# Patient Record
Sex: FEMALE | Race: BLACK OR AFRICAN AMERICAN | URBAN - METROPOLITAN AREA
[De-identification: names, ages, dates, MRNs, and addresses within clinical notes are randomized per-mention and may not be internally consistent; named-entity substitution may affect disease eponyms.]

---

## 2017-06-12 ENCOUNTER — HISTORICAL (OUTPATIENT)
Dept: ADMINISTRATIVE | Facility: HOSPITAL | Age: 42
End: 2017-06-12

## 2017-06-12 LAB
ABS NEUT (OLG): 6.44 X10(3)/MCL (ref 2.1–9.2)
BASOPHILS # BLD AUTO: 0.07 X10(3)/MCL
BASOPHILS NFR BLD AUTO: 1 % (ref 0–1)
EOSINOPHIL # BLD AUTO: 0.11 X10(3)/MCL
EOSINOPHIL NFR BLD AUTO: 1 % (ref 0–5)
ERYTHROCYTE [DISTWIDTH] IN BLOOD BY AUTOMATED COUNT: 15.7 % (ref 11.5–14.5)
HCT VFR BLD AUTO: 32.6 % (ref 35–46)
HGB BLD-MCNC: 10 GM/DL (ref 12–16)
IMM GRANULOCYTES # BLD AUTO: 0.01 10*3/UL
IMM GRANULOCYTES NFR BLD AUTO: 0 %
LYMPHOCYTES # BLD AUTO: 2.22 X10(3)/MCL
LYMPHOCYTES NFR BLD AUTO: 23 % (ref 15–40)
MCH RBC QN AUTO: 25.8 PG (ref 26–34)
MCHC RBC AUTO-ENTMCNC: 30.7 GM/DL (ref 31–37)
MCV RBC AUTO: 84 FL (ref 80–100)
MONOCYTES # BLD AUTO: 0.66 X10(3)/MCL
MONOCYTES NFR BLD AUTO: 7 % (ref 4–12)
NEUTROPHILS # BLD AUTO: 6.44 X10(3)/MCL
NEUTROPHILS NFR BLD AUTO: 68 X10(3)/MCL
PLATELET # BLD AUTO: 380 X10(3)/MCL (ref 130–400)
PMV BLD AUTO: 9.6 FL (ref 7.4–10.4)
RBC # BLD AUTO: 3.88 X10(6)/MCL (ref 4–5.2)
TSH SERPL-ACNC: 0.87 MIU/L (ref 0.5–5)
WBC # SPEC AUTO: 9.5 X10(3)/MCL (ref 4.5–11)

## 2019-03-19 ENCOUNTER — HOSPITAL ENCOUNTER (OUTPATIENT)
Dept: ADMINISTRATIVE | Facility: HOSPITAL | Age: 44
End: 2019-03-20
Attending: INTERNAL MEDICINE | Admitting: INTERNAL MEDICINE

## 2019-03-20 LAB
BUN SERPL-MCNC: 6 MG/DL (ref 7–18)
CALCIUM SERPL-MCNC: 8.2 MG/DL (ref 8.5–10.1)
CHLORIDE SERPL-SCNC: 109 MMOL/L (ref 98–107)
CO2 SERPL-SCNC: 23 MMOL/L (ref 21–32)
CREAT SERPL-MCNC: 0.73 MG/DL (ref 0.55–1.02)
CREAT/UREA NIT SERPL: 8.2
ERYTHROCYTE [DISTWIDTH] IN BLOOD BY AUTOMATED COUNT: 17.7 % (ref 11.5–17)
GLUCOSE SERPL-MCNC: 136 MG/DL (ref 74–106)
HCT VFR BLD AUTO: 30.4 % (ref 37–47)
HGB BLD-MCNC: 9.1 GM/DL (ref 12–16)
MAGNESIUM SERPL-MCNC: 2.4 MG/DL (ref 1.8–2.4)
MCH RBC QN AUTO: 24.8 PG (ref 27–31)
MCHC RBC AUTO-ENTMCNC: 29.9 GM/DL (ref 33–36)
MCV RBC AUTO: 82.8 FL (ref 80–94)
PLATELET # BLD AUTO: 337 X10(3)/MCL (ref 130–400)
PMV BLD AUTO: 9.5 FL (ref 9.4–12.4)
POTASSIUM SERPL-SCNC: 4.1 MMOL/L (ref 3.5–5.1)
RBC # BLD AUTO: 3.67 X10(6)/MCL (ref 4.2–5.4)
SODIUM SERPL-SCNC: 140 MMOL/L (ref 136–145)
WBC # SPEC AUTO: 9.6 X10(3)/MCL (ref 4.5–11.5)

## 2022-04-10 ENCOUNTER — HISTORICAL (OUTPATIENT)
Dept: ADMINISTRATIVE | Facility: HOSPITAL | Age: 47
End: 2022-04-10

## 2022-04-24 VITALS
SYSTOLIC BLOOD PRESSURE: 131 MMHG | HEIGHT: 61 IN | WEIGHT: 185.44 LBS | DIASTOLIC BLOOD PRESSURE: 82 MMHG | OXYGEN SATURATION: 99 % | BODY MASS INDEX: 35.01 KG/M2

## 2024-05-01 ENCOUNTER — HOSPITAL ENCOUNTER (INPATIENT)
Facility: HOSPITAL | Age: 49
LOS: 4 days | Discharge: HOME HEALTH | DRG: 069 | End: 2024-05-05
Attending: EMERGENCY MEDICINE | Admitting: HOSPITALIST
Payer: MEDICAID

## 2024-05-01 DIAGNOSIS — R29.818 ACUTE FOCAL NEUROLOGICAL DEFICIT: ICD-10-CM

## 2024-05-01 DIAGNOSIS — R53.1 ACUTE LEFT-SIDED WEAKNESS: ICD-10-CM

## 2024-05-01 DIAGNOSIS — G45.9 TIA (TRANSIENT ISCHEMIC ATTACK): Primary | ICD-10-CM

## 2024-05-01 DIAGNOSIS — I63.9 STROKE: ICD-10-CM

## 2024-05-01 LAB
ALBUMIN SERPL-MCNC: 4.1 G/DL (ref 3.5–5)
ALBUMIN/GLOB SERPL: 1 RATIO (ref 1.1–2)
ALP SERPL-CCNC: 111 UNIT/L (ref 40–150)
ALT SERPL-CCNC: 15 UNIT/L (ref 0–55)
AST SERPL-CCNC: 26 UNIT/L (ref 5–34)
BASOPHILS # BLD AUTO: 0.07 X10(3)/MCL
BASOPHILS NFR BLD AUTO: 1 %
BILIRUB SERPL-MCNC: 0.3 MG/DL
BUN SERPL-MCNC: 6.1 MG/DL (ref 9.8–20.1)
CALCIUM SERPL-MCNC: 9.8 MG/DL (ref 8.4–10.2)
CHLORIDE SERPL-SCNC: 112 MMOL/L (ref 98–107)
CO2 SERPL-SCNC: 15 MMOL/L (ref 22–29)
CREAT SERPL-MCNC: 0.78 MG/DL (ref 0.55–1.02)
EOSINOPHIL # BLD AUTO: 0.16 X10(3)/MCL (ref 0–0.9)
EOSINOPHIL NFR BLD AUTO: 2.2 %
ERYTHROCYTE [DISTWIDTH] IN BLOOD BY AUTOMATED COUNT: 16.7 % (ref 11.5–17)
EST. AVERAGE GLUCOSE BLD GHB EST-MCNC: 111.2 MG/DL
ETHANOL SERPL-MCNC: 174 MG/DL
GFR SERPLBLD CREATININE-BSD FMLA CKD-EPI: >60 MLS/MIN/1.73/M2
GLOBULIN SER-MCNC: 4.3 GM/DL (ref 2.4–3.5)
GLUCOSE SERPL-MCNC: 148 MG/DL (ref 74–100)
HBA1C MFR BLD: 5.5 %
HCT VFR BLD AUTO: 33.3 % (ref 37–47)
HGB BLD-MCNC: 10.2 G/DL (ref 12–16)
IMM GRANULOCYTES # BLD AUTO: 0.03 X10(3)/MCL (ref 0–0.04)
IMM GRANULOCYTES NFR BLD AUTO: 0.4 %
INR PPP: 1
LYMPHOCYTES # BLD AUTO: 3.02 X10(3)/MCL (ref 0.6–4.6)
LYMPHOCYTES NFR BLD AUTO: 42.1 %
MCH RBC QN AUTO: 24.6 PG (ref 27–31)
MCHC RBC AUTO-ENTMCNC: 30.6 G/DL (ref 33–36)
MCV RBC AUTO: 80.4 FL (ref 80–94)
MONOCYTES # BLD AUTO: 0.31 X10(3)/MCL (ref 0.1–1.3)
MONOCYTES NFR BLD AUTO: 4.3 %
NEUTROPHILS # BLD AUTO: 3.58 X10(3)/MCL (ref 2.1–9.2)
NEUTROPHILS NFR BLD AUTO: 50 %
NRBC BLD AUTO-RTO: 0 %
PLATELET # BLD AUTO: 466 X10(3)/MCL (ref 130–400)
PMV BLD AUTO: 9.7 FL (ref 7.4–10.4)
POC PTINR: 1.3 (ref 0.9–1.2)
POC PTWBT: 16 SEC (ref 9.7–14.3)
POCT GLUCOSE: 165 MG/DL (ref 70–110)
POTASSIUM SERPL-SCNC: 4.1 MMOL/L (ref 3.5–5.1)
PROT SERPL-MCNC: 8.4 GM/DL (ref 6.4–8.3)
PROTHROMBIN TIME: 13.5 SECONDS (ref 12.5–14.5)
RBC # BLD AUTO: 4.14 X10(6)/MCL (ref 4.2–5.4)
SAMPLE: ABNORMAL
SODIUM SERPL-SCNC: 143 MMOL/L (ref 136–145)
TROPONIN I SERPL-MCNC: <0.01 NG/ML (ref 0–0.04)
TSH SERPL-ACNC: 1.66 UIU/ML (ref 0.35–4.94)
WBC # SPEC AUTO: 7.17 X10(3)/MCL (ref 4.5–11.5)

## 2024-05-01 PROCEDURE — 96375 TX/PRO/DX INJ NEW DRUG ADDON: CPT

## 2024-05-01 PROCEDURE — 85610 PROTHROMBIN TIME: CPT | Performed by: EMERGENCY MEDICINE

## 2024-05-01 PROCEDURE — 85025 COMPLETE CBC W/AUTO DIFF WBC: CPT | Performed by: EMERGENCY MEDICINE

## 2024-05-01 PROCEDURE — 25000003 PHARM REV CODE 250: Performed by: NURSE PRACTITIONER

## 2024-05-01 PROCEDURE — 36415 COLL VENOUS BLD VENIPUNCTURE: CPT | Performed by: NURSE PRACTITIONER

## 2024-05-01 PROCEDURE — 96374 THER/PROPH/DIAG INJ IV PUSH: CPT

## 2024-05-01 PROCEDURE — 82077 ASSAY SPEC XCP UR&BREATH IA: CPT | Performed by: EMERGENCY MEDICINE

## 2024-05-01 PROCEDURE — 63600175 PHARM REV CODE 636 W HCPCS: Performed by: NURSE PRACTITIONER

## 2024-05-01 PROCEDURE — 11000001 HC ACUTE MED/SURG PRIVATE ROOM

## 2024-05-01 PROCEDURE — 97162 PT EVAL MOD COMPLEX 30 MIN: CPT

## 2024-05-01 PROCEDURE — 92523 SPEECH SOUND LANG COMPREHEN: CPT

## 2024-05-01 PROCEDURE — 21400001 HC TELEMETRY ROOM

## 2024-05-01 PROCEDURE — 93010 ELECTROCARDIOGRAM REPORT: CPT | Mod: ,,, | Performed by: INTERNAL MEDICINE

## 2024-05-01 PROCEDURE — 25000003 PHARM REV CODE 250: Performed by: HOSPITALIST

## 2024-05-01 PROCEDURE — 99285 EMERGENCY DEPT VISIT HI MDM: CPT | Mod: 25

## 2024-05-01 PROCEDURE — 80053 COMPREHEN METABOLIC PANEL: CPT | Performed by: EMERGENCY MEDICINE

## 2024-05-01 PROCEDURE — 84443 ASSAY THYROID STIM HORMONE: CPT | Performed by: EMERGENCY MEDICINE

## 2024-05-01 PROCEDURE — 63600175 PHARM REV CODE 636 W HCPCS: Performed by: EMERGENCY MEDICINE

## 2024-05-01 PROCEDURE — 93005 ELECTROCARDIOGRAM TRACING: CPT

## 2024-05-01 PROCEDURE — 99223 1ST HOSP IP/OBS HIGH 75: CPT | Mod: ,,, | Performed by: SPECIALIST

## 2024-05-01 PROCEDURE — 80299 QUANTITATIVE ASSAY DRUG: CPT | Performed by: NURSE PRACTITIONER

## 2024-05-01 PROCEDURE — 84484 ASSAY OF TROPONIN QUANT: CPT | Performed by: EMERGENCY MEDICINE

## 2024-05-01 PROCEDURE — 97166 OT EVAL MOD COMPLEX 45 MIN: CPT

## 2024-05-01 PROCEDURE — 82962 GLUCOSE BLOOD TEST: CPT

## 2024-05-01 PROCEDURE — 83036 HEMOGLOBIN GLYCOSYLATED A1C: CPT | Performed by: NURSE PRACTITIONER

## 2024-05-01 RX ORDER — ONDANSETRON 4 MG/1
4 TABLET, ORALLY DISINTEGRATING ORAL
Status: DISPENSED | OUTPATIENT
Start: 2024-05-01 | End: 2024-05-01

## 2024-05-01 RX ORDER — HYDRALAZINE HYDROCHLORIDE 20 MG/ML
10 INJECTION INTRAMUSCULAR; INTRAVENOUS EVERY 6 HOURS PRN
Status: DISCONTINUED | OUTPATIENT
Start: 2024-05-01 | End: 2024-05-05 | Stop reason: HOSPADM

## 2024-05-01 RX ORDER — ONDANSETRON HYDROCHLORIDE 2 MG/ML
4 INJECTION, SOLUTION INTRAVENOUS EVERY 4 HOURS PRN
Status: DISCONTINUED | OUTPATIENT
Start: 2024-05-01 | End: 2024-05-05 | Stop reason: HOSPADM

## 2024-05-01 RX ORDER — SODIUM CHLORIDE 0.9 % (FLUSH) 0.9 %
10 SYRINGE (ML) INJECTION
Status: DISCONTINUED | OUTPATIENT
Start: 2024-05-01 | End: 2024-05-05 | Stop reason: HOSPADM

## 2024-05-01 RX ORDER — FENTANYL CITRATE 50 UG/ML
50 INJECTION, SOLUTION INTRAMUSCULAR; INTRAVENOUS
Status: COMPLETED | OUTPATIENT
Start: 2024-05-01 | End: 2024-05-01

## 2024-05-01 RX ORDER — TALC
6 POWDER (GRAM) TOPICAL NIGHTLY PRN
Status: DISCONTINUED | OUTPATIENT
Start: 2024-05-01 | End: 2024-05-05 | Stop reason: HOSPADM

## 2024-05-01 RX ORDER — LORAZEPAM 2 MG/ML
1 INJECTION INTRAMUSCULAR
Status: COMPLETED | OUTPATIENT
Start: 2024-05-01 | End: 2024-05-01

## 2024-05-01 RX ORDER — ATORVASTATIN CALCIUM 40 MG/1
40 TABLET, FILM COATED ORAL DAILY
Status: DISCONTINUED | OUTPATIENT
Start: 2024-05-01 | End: 2024-05-05 | Stop reason: HOSPADM

## 2024-05-01 RX ORDER — BISACODYL 10 MG/1
10 SUPPOSITORY RECTAL DAILY PRN
Status: DISCONTINUED | OUTPATIENT
Start: 2024-05-01 | End: 2024-05-05 | Stop reason: HOSPADM

## 2024-05-01 RX ORDER — ASPIRIN 300 MG/1
300 SUPPOSITORY RECTAL DAILY
Status: DISCONTINUED | OUTPATIENT
Start: 2024-05-01 | End: 2024-05-01

## 2024-05-01 RX ORDER — NAPROXEN SODIUM 220 MG/1
81 TABLET, FILM COATED ORAL DAILY
Status: ON HOLD | COMMUNITY
End: 2024-05-05

## 2024-05-01 RX ORDER — PROCHLORPERAZINE EDISYLATE 5 MG/ML
5 INJECTION INTRAMUSCULAR; INTRAVENOUS EVERY 6 HOURS PRN
Status: DISCONTINUED | OUTPATIENT
Start: 2024-05-01 | End: 2024-05-05 | Stop reason: HOSPADM

## 2024-05-01 RX ORDER — ACETAMINOPHEN 325 MG/1
650 TABLET ORAL EVERY 6 HOURS PRN
Status: DISCONTINUED | OUTPATIENT
Start: 2024-05-01 | End: 2024-05-05 | Stop reason: HOSPADM

## 2024-05-01 RX ORDER — TRAMADOL HYDROCHLORIDE 50 MG/1
50 TABLET ORAL EVERY 6 HOURS PRN
Status: DISCONTINUED | OUTPATIENT
Start: 2024-05-01 | End: 2024-05-03

## 2024-05-01 RX ORDER — NAPROXEN SODIUM 220 MG/1
81 TABLET, FILM COATED ORAL DAILY
Status: DISCONTINUED | OUTPATIENT
Start: 2024-05-01 | End: 2024-05-05 | Stop reason: HOSPADM

## 2024-05-01 RX ORDER — LABETALOL HYDROCHLORIDE 5 MG/ML
10 INJECTION, SOLUTION INTRAVENOUS
Status: DISCONTINUED | OUTPATIENT
Start: 2024-05-01 | End: 2024-05-01

## 2024-05-01 RX ORDER — ENOXAPARIN SODIUM 100 MG/ML
40 INJECTION SUBCUTANEOUS EVERY 24 HOURS
Status: DISCONTINUED | OUTPATIENT
Start: 2024-05-01 | End: 2024-05-05 | Stop reason: HOSPADM

## 2024-05-01 RX ADMIN — ATORVASTATIN CALCIUM 40 MG: 40 TABLET, FILM COATED ORAL at 09:05

## 2024-05-01 RX ADMIN — ACETAMINOPHEN 325MG 650 MG: 325 TABLET ORAL at 08:05

## 2024-05-01 RX ADMIN — TRAMADOL HYDROCHLORIDE 50 MG: 50 TABLET, COATED ORAL at 10:05

## 2024-05-01 RX ADMIN — FENTANYL CITRATE 50 MCG: 50 INJECTION, SOLUTION INTRAMUSCULAR; INTRAVENOUS at 05:05

## 2024-05-01 RX ADMIN — TRAMADOL HYDROCHLORIDE 50 MG: 50 TABLET, COATED ORAL at 11:05

## 2024-05-01 RX ADMIN — Medication 6 MG: at 10:05

## 2024-05-01 RX ADMIN — ASPIRIN 81 MG CHEWABLE TABLET 81 MG: 81 TABLET CHEWABLE at 09:05

## 2024-05-01 RX ADMIN — ENOXAPARIN SODIUM 40 MG: 40 INJECTION SUBCUTANEOUS at 05:05

## 2024-05-01 RX ADMIN — LORAZEPAM 1 MG: 2 INJECTION INTRAMUSCULAR; INTRAVENOUS at 06:05

## 2024-05-01 NOTE — PLAN OF CARE
Problem: Occupational Therapy  Goal: Occupational Therapy Goal  Description: Goals to be met by: in 1 month      Patient will increase functional independence with ADLs by performing:    UE Dressing with Modified [place].  LE Dressing with Modified [place] and Assistive Devices as needed.  Grooming while standing with Modified [place].  Toileting from toilet with Modified [place] and Assistive Devices as needed for hygiene and clothing management.   Toilet transfer to toilet with Modified [place].  Increased functional strength to WNL for LUE.    Outcome: Progressing

## 2024-05-01 NOTE — ED PROVIDER NOTES
Encounter Date: 5/1/2024    SCRIBE #1 NOTE: I, Ella Carvajal, am scribing for, and in the presence of,  Kelly Onofre DO. I have scribed the following portions of the note - Other sections scribed: HPI, ROS, PE.       History     Chief Complaint   Patient presents with    Cerebrovascular Accident     EMS states pt went to bed between 9390-5337, woke up this am with left sided weakness and facial droop.  en route.     49 year old female with history of PAD with stents presents to the ED via EMS for possible CVA. EMS reports that pt went to bed around 23:00-00:00 and woke up at 04:00 with left sided weakness, numbness, and tingling. Pt is on daily ASA, states she used to be on a blood thinner but not anymore. Pt states that she is having trouble getting words out.     The history is provided by the patient and the EMS personnel. No  was used.     Review of patient's allergies indicates:   Allergen Reactions    Penicillins      Past Medical History:   Diagnosis Date    PAD (peripheral artery disease)      No past surgical history on file.  No family history on file.     Review of Systems   Neurological:  Positive for speech difficulty, weakness and numbness.       Physical Exam     Initial Vitals [05/01/24 0448]   BP Pulse Resp Temp SpO2   (!) 168/92 85 18 97.7 °F (36.5 °C) 99 %      MAP       --         Physical Exam    Nursing note and vitals reviewed.  Constitutional: She appears well-developed and well-nourished. She is not diaphoretic. She does not appear ill. No distress.   HENT:   Head: Normocephalic and atraumatic.   Right Ear: External ear normal.   Left Ear: External ear normal.   Mouth/Throat: Oropharynx is clear and moist.   Eyes: Conjunctivae and EOM are normal. Pupils are equal, round, and reactive to light.   Neck: Neck supple. No tracheal deviation present.   Cardiovascular:  Normal rate, regular rhythm, normal heart sounds and intact distal pulses.           No murmur  heard.  Pulmonary/Chest: Breath sounds normal. No respiratory distress. She has no wheezes. She has no rhonchi. She has no rales.   Abdominal: Abdomen is soft. Bowel sounds are normal. She exhibits no distension. There is no abdominal tenderness.   No right CVA tenderness.  No left CVA tenderness.   Musculoskeletal:         General: Normal range of motion.      Cervical back: Neck supple.     Neurological: She is alert and oriented to person, place, and time. GCS score is 15. GCS eye subscore is 4. GCS verbal subscore is 5. GCS motor subscore is 6.   Left arm and leg drift, able to hold left leg 3-4 seconds, 4/5 LLE and 3/5 LUE. Mild facial droop on left.    Skin: Skin is warm and dry. Capillary refill takes less than 2 seconds. No rash noted. No pallor.   Psychiatric: She has a normal mood and affect. Her behavior is normal.         ED Course   Procedures  Labs Reviewed   COMPREHENSIVE METABOLIC PANEL - Abnormal; Notable for the following components:       Result Value    Chloride 112 (*)     Carbon Dioxide 15 (*)     Glucose Level 148 (*)     Blood Urea Nitrogen 6.1 (*)     Protein Total 8.4 (*)     Globulin 4.3 (*)     Albumin/Globulin Ratio 1.0 (*)     All other components within normal limits   CBC WITH DIFFERENTIAL - Abnormal; Notable for the following components:    RBC 4.14 (*)     Hgb 10.2 (*)     Hct 33.3 (*)     MCH 24.6 (*)     MCHC 30.6 (*)     Platelet 466 (*)     All other components within normal limits   POCT GLUCOSE - Abnormal; Notable for the following components:    POCT Glucose 165 (*)     All other components within normal limits   ISTAT PROCEDURE - Abnormal; Notable for the following components:    POC PTWBT 16.0 (*)     POC PTINR 1.3 (*)     All other components within normal limits   PROTIME-INR - Normal   TSH - Normal   TROPONIN I - Normal   CBC W/ AUTO DIFFERENTIAL    Narrative:     The following orders were created for panel order CBC W/ AUTO DIFFERENTIAL.  Procedure                                Abnormality         Status                     ---------                               -----------         ------                     CBC with Differential[[id number]]       Abnormal            Final result                 Please view results for these tests on the individual orders.   HEMOGLOBIN A1C   POCT GLUCOSE, HAND-HELD DEVICE     EKG Readings: (Independently Interpreted)   Initial Reading: No STEMI. Rhythm: Normal Sinus Rhythm. Heart Rate: 88. Ectopy: No Ectopy.       Imaging Results              MRI Brain Limited Without Contrast (In process)                      CTA Head and Neck (xpd) (Preliminary result)  Result time 05/01/24 05:38:01   Procedure changed from CTA STROKE MULTI-PHASE     Preliminary result by Damien Vila Jr., MD (05/01/24 05:38:01)                   Narrative:    START OF REPORT:  Technique: CT angiogram of the intracranial vessels was performed without and with intravenous contrast with direct axial as well as sagittal and coronal reformations. CT angiogram of the neck vessels was performed without and with intravenous contrast with direct axial as well as sagittal and coronal reformations.    Comparison: None.    Clinical history: Left arm weakness.    Findings:  Intracranial Vascular structures:  Internal carotid arteries: Unremarkable.  Middle cerebral arteries: Unremarkable.  Anterior cerebral arteries: Unremarkable.  Vertebral arteries: The left vertebral artery is dominant. The vertebrobasilar junction is unremarkable. Both vertebral arteries are patent and normal in caliber.  Basilar artery: Unremarkable.  Posterior cerebral arteries: Unremarkable.  Neck Vascular structures: The visualized aorta and origin of the great vessels of the neck appear unremarkable.  Carotids:  Common carotid arteries: The right and the left common carotid arteries appear unremarkable.  Internal carotid artery: The right and the left internal carotid arteries appear  unremarkable.  Vertebral arteries: Left vertebral artery is dominant. The origins of both vertebral arteries are unremarkable. Both vertebral arteries are patent and normal in caliber.  Brain parenchyma: No abnormal intracranial enhancement is seen on the post contrast images.    Miscellaneous: The right lobe of the thyroid is small in size. The lungs are slightly heterogeneous, which may reflect small airways disease versus mosaic attenuation.      Impression:  1. Unremarkable CT angiogram of the head. Unremarkable CT angiogram of the neck. Details and findings as noted above.                                         CT HEAD FOR STROKE (Final result)  Result time 05/01/24 06:09:12   Procedure changed from CT Head Without Contrast     Final result by Kvng Mcgee MD (05/01/24 06:09:12)                   Impression:      No acute intracranial abnormality identified.      Electronically signed by: Kvng Mcgee  Date:    05/01/2024  Time:    06:09               Narrative:    EXAMINATION:  CT HEAD FOR STROKE    CLINICAL HISTORY:  Neuro deficit, acute, stroke suspected;    TECHNIQUE:  Low dose axial images were obtained through the head.  Coronal and sagittal reformations were also performed. Contrast was not administered.    Automatic exposure control was utilized to reduce the patient's radiation dose.    DLP = 10 60    COMPARISON:  09/18/2016    FINDINGS:  No acute intracranial hemorrhage, edema or mass. No acute parenchymal abnormality.    Diffuse cerebral atrophy with concordant ventricular enlargement.    There is normal gray white differentiation.    The osseous structures are normal.    The mastoid air cells are clear.    The auditory canals are patent bilaterally.    The globes and orbital contents are normal bilaterally.    Opacification of the left maxillary sinus.                        Preliminary result by Damien Vila Jr., MD (05/01/24 05:22:29)                   Impression:    1. No acute  intracranial process identified. Details and other findings as noted above.               Narrative:    START OF REPORT:  Technique: CT of the head was performed without intravenous contrast with axial as well as coronal and sagittal images.    Comparison: None.    Dosage Information: Automated exposure control was utilized.    Clinical history: Left arm weakness.    Findings:  Hemorrhage: No acute intracranial hemorrhage is seen.  CSF spaces: The ventricles sulci and basal cisterns are within normal limits.  Brain parenchyma: Unremarkable with preservation of the grey white junction throughout. No acute infarct is identified.  Cerebellum: Unremarkable.  Sella and skull base: The sella appears to be within normal limits for age.  Herniation: None.  Intracranial calcifications: Incidental note is made of bilateral choroid plexus calcification. Incidental note is made of some pineal region calcification. Incidental note is made of some calcification of the falx. Incidental note is made of bilateral basal ganglia calcifcation.  Calvarium: No acute linear or depressed skull fracture is seen.    Maxillofacial Structures:  Paranasal sinuses: There is near complete opacification of the left frontoethmoid and maxillary sinus, consistent with chronic sinus disease. There is moderate-to-severe mucoperiosteal thickening of the left sphenoid sinus with soft tissue opacification of the sphenoethmoid recess. There is partial rightturbinectomy with maxillary antrostomy.  Orbits: The orbits appear unremarkable.  Zygomatic arches: The zygomatic arches are intact and unremarkable.  Temporal bones and mastoids: The temporal bones and mastoids appear unremarkable.  TMJ: The mandibular condyles appear normally placed with respect to the mandibular fossa.    Notifications: This is a stroke protocol report and the results were discussed with the emergency room physician Dr. Onofre prior to dictation at 2024-05-01 05:20:14 CDT.                                          Medications   ondansetron disintegrating tablet 4 mg (4 mg Oral Not Given 5/1/24 0515)   sodium chloride 0.9% flush 10 mL (has no administration in time range)   acetaminophen tablet 650 mg (has no administration in time range)   hydrALAZINE injection 10 mg (has no administration in time range)   ondansetron injection 4 mg (has no administration in time range)   prochlorperazine injection Soln 5 mg (has no administration in time range)   bisacodyL suppository 10 mg (has no administration in time range)   enoxaparin injection 40 mg (has no administration in time range)   aspirin suppository 300 mg (has no administration in time range)   fentaNYL injection 50 mcg (50 mcg Intravenous Given 5/1/24 05)   LORazepam injection 1 mg (1 mg Intravenous Given 5/1/24 0623)     Medical Decision Making  Differential diagnosis includes but is not limited to CVA, TIA, ICH, migraine, seizure      CT head and CTA nml  Patient taken for MRI limited to determine if TNKase could be used  No TNKase per neuro  Admitted to hospitalist     Problems Addressed:  Acute focal neurological deficit: acute illness or injury that poses a threat to life or bodily functions  Acute left-sided weakness: acute illness or injury that poses a threat to life or bodily functions    Amount and/or Complexity of Data Reviewed  Independent Historian: EMS     Details: EMS reports that pt went to bed around 23:00-00:00 and woke up at 04:00 with left sided weakness, numbness, and tingling. Pt is on daily ASA and blood thinners but does not know the name  External Data Reviewed: notes.  Labs: ordered. Decision-making details documented in ED Course.  Radiology: ordered. Decision-making details documented in ED Course.  ECG/medicine tests: ordered and independent interpretation performed. Decision-making details documented in ED Course.    Risk  Prescription drug management.  Decision regarding hospitalization.              Attending  Attestation:           Physician Attestation for Scribe:  Physician Attestation Statement for Scribe #1: I, Kelly Onofre, DO, reviewed documentation, as scribed by Ella Carvajal in my presence, and it is both accurate and complete.             ED Course as of 05/01/24 0746   Wed May 01, 2024   0521 CT head negative per radiology  [KM]   0555 Neurology in ED to evaluate patient  [KM]   0605 Spoke with hospitalist for admission  [KM]   0646 No TNKase per neuro [KM]      ED Course User Index  [KM] Kelly Onofre MD                           Clinical Impression:  Final diagnoses:  [R29.818] Acute focal neurological deficit  [R53.1] Acute left-sided weakness (Primary)          ED Disposition Condition    Admit Stable                  Kelly Onofre MD  05/01/24 0746

## 2024-05-01 NOTE — HPI
Naheed Gray is a 49-year-old female with past medical history of Hypertension, thyroid disease and PVD who presented to the ER with complaints of left sided weakness of upper and lower extremities associated with numbness and tingling. LKN was around 2300 last night before going to bed. She awoke around 4 am this morning, and went to the bathroom and had to grab hold of the wall due to weakness. She got to her recliner and called 911. CT head and CTA H/N unremarkable. Stat limited MRI without brain ordered. Imaging pending.

## 2024-05-01 NOTE — H&P
[facility] Hospital Medicine History & Physical Examination       Patient Name: Naheed Carter  MRN: [id number]  Patient Class: IP- Inpatient   Admission Date: 5/1/2024   Admitting Physician:  Service   Attending Physician: Ar Ordonez MD  Primary Care Provider: Awais Lomas MD  Face-to-Face encounter date: 05/01/2024  Code Status:Code Status Discussion Note   Chief Complaint: Cerebrovascular Accident (EMS states pt went to bed between 1803-6053, woke up this am with left sided weakness and facial droop.  en route.)          Patient information was obtained from patient, patient's family, past medical records and ER records.     HISTORY OF PRESENT ILLNESS:   Naheed Carter is a 49 y.o. female who  has a past medical history of PAD (peripheral artery disease).. The patient presented to [facility] on 5/1/2024     49-year-old female who presents to the emergency room on 05/01/2024 after acute onset of left-sided weakness.  He was associated with mild aphasia and left-sided facial drooping.  Reports woke up around 4:00 a.m. to use the restroom and noted weakness.  Nearly fell.  Called EMS and he was transported to the hospital.  On admission she was noted to be mildly hypertensive.  On room air.  Labs unremarkable.  CT of the head showed no acute intracranial abnormalities.  She has just gone for her MRI which is pending.  Stroke team has already been notified.  She was not a candidate for TNK administration.  She was risk factors of hypertension.  She was on an aspirin but he was not take it regularly.  No other anticoagulation at home.  She was not on a statin  PAST MEDICAL HISTORY:     Past Medical History:   Diagnosis Date    PAD (peripheral artery disease)    Essential hypertension    PAST SURGICAL HISTORY:     No past surgical history on file.    ALLERGIES:   Penicillins    FAMILY HISTORY:   Reviewed and negative    SOCIAL HISTORY:     Social History     Tobacco  Use    Smoking status: Not on file    Smokeless tobacco: Not on file   Substance Use Topics    Alcohol use: Not on file        HOME MEDICATIONS:     Aspirin 81 mg  Patient was get her other medications from home.  Will update once we have these      REVIEW OF SYSTEMS:   Except as documented, all other systems reviewed and negative     PHYSICAL EXAM:     VITAL SIGNS: 24 HRS MIN & MAX LAST   Temp  Min: 97.7 °F (36.5 °C)  Max: 97.7 °F (36.5 °C) 97.7 °F (36.5 °C)   BP  Min: 143/102  Max: 182/106 (!) 166/132   Pulse  Min: 85  Max: 97  87   Resp  Min: 14  Max: 24 (!) 24   SpO2  Min: 98 %  Max: 100 % 100 %       General appearance: Well-developed, well-nourished female in no apparent distress.  HENT: Atraumatic head. Moist mucous membranes of oral cavity.  Eyes: Normal extraocular movements.   Neck: Supple.   Lungs: Clear to auscultation bilaterally. No wheezing present.   Heart: Regular rate and rhythm. S1 and S2 present with no murmurs/gallop/rub. No pedal edema. No JVD present.   Abdomen: Soft, non-distended, non-tender. No rebound tenderness/guarding. Bowel sounds are normal.   Extremities: No cyanosis, clubbing, or edema.  Skin: No Rash.   Neuro: Motor and sensory exams grossly intact. Good tone. Muscle strength 5/5 in all 4 extremities  Psych/mental status: Appropriate mood and affect. Responds appropriately to questions.     LABS AND IMAGING:     Recent Labs   Lab 05/01/24  0500   WBC 7.17   RBC 4.14*   HGB 10.2*   HCT 33.3*   MCV 80.4   MCH 24.6*   MCHC 30.6*   RDW 16.7   *   MPV 9.7       Recent Labs   Lab 05/01/24  0500      K 4.1   CO2 15*   BUN 6.1*   CREATININE 0.78   CALCIUM 9.8   ALBUMIN 4.1   ALKPHOS 111   ALT 15   AST 26   BILITOT 0.3       Microbiology Results (last 7 days)       ** No results found for the last 168 hours. **             CT HEAD FOR STROKE  Narrative: EXAMINATION:  CT HEAD FOR STROKE    CLINICAL HISTORY:  Neuro deficit, acute, stroke suspected;    TECHNIQUE:  Low dose axial  images were obtained through the head.  Coronal and sagittal reformations were also performed. Contrast was not administered.    Automatic exposure control was utilized to reduce the patient's radiation dose.    DLP = 10 60    COMPARISON:  09/18/2016    FINDINGS:  No acute intracranial hemorrhage, edema or mass. No acute parenchymal abnormality.    Diffuse cerebral atrophy with concordant ventricular enlargement.    There is normal gray white differentiation.    The osseous structures are normal.    The mastoid air cells are clear.    The auditory canals are patent bilaterally.    The globes and orbital contents are normal bilaterally.    Opacification of the left maxillary sinus.  Impression: No acute intracranial abnormality identified.    Electronically signed by: Kvng Mcgee  Date:    05/01/2024  Time:    06:09  CTA Head and Neck (xpd)  START OF REPORT:  Technique: CT angiogram of the intracranial vessels was performed without and with intravenous contrast with direct axial as well as sagittal and coronal reformations. CT angiogram of the neck vessels was performed without and with intravenous contrast with direct axial as well as sagittal and coronal reformations.    Comparison: None.    Clinical history: Left arm weakness.    Findings:  Intracranial Vascular structures:  Internal carotid arteries: Unremarkable.  Middle cerebral arteries: Unremarkable.  Anterior cerebral arteries: Unremarkable.  Vertebral arteries: The left vertebral artery is dominant. The vertebrobasilar junction is unremarkable. Both vertebral arteries are patent and normal in caliber.  Basilar artery: Unremarkable.  Posterior cerebral arteries: Unremarkable.  Neck Vascular structures: The visualized aorta and origin of the great vessels of the neck appear unremarkable.  Carotids:  Common carotid arteries: The right and the left common carotid arteries appear unremarkable.  Internal carotid artery: The right and the left internal carotid  arteries appear unremarkable.  Vertebral arteries: Left vertebral artery is dominant. The origins of both vertebral arteries are unremarkable. Both vertebral arteries are patent and normal in caliber.  Brain parenchyma: No abnormal intracranial enhancement is seen on the post contrast images.    Miscellaneous: The right lobe of the thyroid is small in size. The lungs are slightly heterogeneous, which may reflect small airways disease versus mosaic attenuation.    Impression:  1. Unremarkable CT angiogram of the head. Unremarkable CT angiogram of the neck. Details and findings as noted above.      _____________________________________  INPATIENT LIST OF MEDICATIONS     Current Facility-Administered Medications:     acetaminophen tablet 650 mg, 650 mg, Oral, Q6H PRN, Neida Thornton, SARTHAKP    aspirin suppository 300 mg, 300 mg, Rectal, Daily, Neida Thornton, FNP    bisacodyL suppository 10 mg, 10 mg, Rectal, Daily PRN, Neida Thornton, SARTHAKP    enoxaparin injection 40 mg, 40 mg, Subcutaneous, Daily, Neida Thornton, FNP    hydrALAZINE injection 10 mg, 10 mg, Intravenous, Q6H PRN, Neida Thornton, FNP    ondansetron disintegrating tablet 4 mg, 4 mg, Oral, ED 1 Time, Kelly Onofre MD    ondansetron injection 4 mg, 4 mg, Intravenous, Q4H PRN, Neida Thornton, FNP    prochlorperazine injection Soln 5 mg, 5 mg, Intravenous, Q6H PRN, Neida Thornton, SARTHAKP    sodium chloride 0.9% flush 10 mL, 10 mL, Intravenous, PRN, Neida Thornton, FNP  No current outpatient medications on file.      Scheduled Meds:  Current Facility-Administered Medications   Medication Dose Route Frequency    aspirin  300 mg Rectal Daily    enoxparin  40 mg Subcutaneous Daily    ondansetron  4 mg Oral ED 1 Time     Continuous Infusions:  Current Facility-Administered Medications   Medication Dose Route Frequency Last Rate Last Admin     PRN Meds:.  Current Facility-Administered Medications:     acetaminophen, 650 mg, Oral, Q6H PRN     bisacodyL, 10 mg, Rectal, Daily PRN    hydrALAZINE, 10 mg, Intravenous, Q6H PRN    ondansetron, 4 mg, Intravenous, Q4H PRN    prochlorperazine, 5 mg, Intravenous, Q6H PRN    sodium chloride 0.9%, 10 mL, Intravenous, PRN      VTE Prophylaxis: will be placed on appropriate DVT prophylaxis and will be advised to be as mobile as possible and sit in a chair as tolerated  _____________________________________    ASSESSMENT & PLAN:   CVA workup   Left-sided weakness  Essential hypertension    Continue stroke protocol   MRI pending.  Will follow up results.    Ordered echocardiogram and carotid ultrasound.    PT OT and speech therapy to evaluate.  Continue NPO status for now until cleared by speech.    Neurology already following.  She was not a candidate for TNK.  Recommending aspirin and statin once cleared for oral intake.  Permissive hypertension for now.    Critical care diagnosis:  Acute CVA  Critical care interventions: hands on evaluation, review of labs/radiographs/records and discussions with family  Critical care time spent: >32 minutes      Ar Ordonez MD  7:37 AM 05/01/2024

## 2024-05-01 NOTE — PLAN OF CARE
05/01/24 1620   Discharge Assessment   Assessment Type Discharge Planning Assessment   Confirmed/corrected address, phone number and insurance Yes   Confirmed Demographics Correct on Facesheet   Source of Information patient   Communicated TRUPTI with patient/caregiver Date not available/Unable to determine   Reason For Admission Stroke   Do you expect to return to your current living situation? Yes   Do you have help at home or someone to help you manage your care at home? Yes   Who are your caregiver(s) and their phone number(s)? Mariola Carter [phone]   Prior to hospitilization cognitive status: Unable to Assess   Current cognitive status: Alert/Oriented   Walking or Climbing Stairs Difficulty no   Dressing/Bathing Difficulty no   Home Accessibility wheelchair accessible   Home Layout Able to live on 1st floor   Equipment Currently Used at Home none   Do you currently have service(s) that help you manage your care at home? No   Do you take prescription medications? Yes   Do you have prescription coverage? Yes   Coverage Medicare   Who is going to help you get home at discharge? Family   How do you get to doctors appointments? car, drives self;family or friend will provide   Are you on dialysis? No   Do you take coumadin? No   Discharge Plan A Home Health   Discharge Plan B Home Health   DME Needed Upon Discharge  walker, rolling   Discharge Plan discussed with: Patient   Transition of Care Barriers None     17 year old daughter lives with patient. She is not current with any  agencies at this time. Therapy recommends home health and rolling walker. Patient at time of assessment is declining need for home health. Order has been placed for rolling walker and sent to [facility]. [facility] referral completed.

## 2024-05-01 NOTE — PT/OT/SLP EVAL
[facility]  Speech Language Pathology Department  Cognitive-Communication Evaluation    Patient Name:  Naheed Carter   MRN:  [id number]    Recommendations     General recommendations:  SLP intervention not indicated  Communication strategies:  go to room if call light pushed    Discharge therapy intensity: No Therapy Indicated  Barriers to safe discharge: none    History     Naheed Carter is a/n 49 y.o. female admitted for a CVA workup after presenting with left sided weakness, left facial droop and mild aphasia.  Pt passed nursing swallow screen.  MRI negative for acute changes    Past Medical History:   Diagnosis Date    PAD (peripheral artery disease)      Previous level of Function  Education:high school  Occupation: unemployed  Lives: with children  Handed: Right  Glasses: no  Hearing Aids: no  Home Responsibilities: drives, financial management, medication/health management, laundry, shopping, meal preparation, and cleaning    Subjective     Patient awake, alert, and cooperative.  Patient goals: to go home   Spiritual/Cultural/Yarsanism Beliefs/Practices that affect care: no    Pain/Comfort: Pain Rating 1: 0/10  Respiratory Status: room air    Objective     ORAL MUSCULATURE  Dentition: own teeth  Facial Movement: WFL  Buccal Strength & Mobility: WFL  Mandibular Strength & Mobility: WFL  Oral Labial Strength & Mobility: WFL  Lingual Strength & Mobility: WFL    SPEECH PRODUCTION  Phoneme Production: adequate  Voice Quality: adequate  Voice Production: adequate  Speech Rate: appropriate  Loudness: reduced  Respiration: WFL for speech  Resonance: adequate  Prosody: adequate  Speech Intelligibility  Known Context: Greater that 90%  Unknown Context: Greater that 90%    AUDITORY COMPREHENSION  Following Directions:  1-Step: 100%  2-Step: 100%  Yes/No Questions:  Biographical: 100%  Environmental: 100%  Simple: 100%    VERBAL EXPRESSION  Automatic Speech:  Functional needs:  Within Functional Limits  Confrontation Naming  Objects: 100%  Wh- Questions:  Object name: 100%  Object function: 100%    COGNITION  Orientation:  Person: yes  Place: yes  Time: yes  Situation: yes   Attention:  Focused: Within Functional Limits  Sustained: Within Functional Limits  Pragmatics:  Within Functional Limits  Memory:  Immediate: Within Functional Limits  Delayed: Within Functional Limits  Long Term: Within Functional Limits  Problem Solving  Functional simple: Within Functional Limits  Executive Function:  Within Functional Limits    Assessment     Pt presents with functional speech/language and cognitive abilities.    Patient Education     Patient provided with verbal education regarding SLP POC.  Understanding was verbalized.    Plan     SLP Follow-Up:  No   Plan of Care reviewed with:  patient      Time Tracking     SLP Treatment Date:   05/01/24  Speech Start Time:  1140  Speech Stop Time:  1155     Speech Total Time (min):  15 min    Billable minutes:  Evaluation of Speech Sound Production with Comprehension and Expression, 15 minutes     05/01/2024

## 2024-05-01 NOTE — Clinical Note
Diagnosis: Acute left-sided weakness [577793]  Future Attending Provider: JANELLE FRANKEL [[id number]]  Admit to which facility:: [facility] [[id number]]  Reason for IP Medical Treatment  (Clinical interventions that can o nly be accomplished in the IP setting? ) :: stroke workup  Estimated Length of Stay:: 2 midnights  I certify that Inpatient services for greater than or equal to 2 midnights are medically necessary:: Yes  Plans for Post-Acute care--if anticipated (pi ck the single best option):: A. No post acute care anticipated at this time

## 2024-05-01 NOTE — PT/OT/SLP EVAL
Occupational Therapy  Evaluation    Name: Naheed Carter  MRN: [id number]  Admitting Diagnosis: CVA w/u, L side weakness, htn   Recent Surgery: * No surgery found *      Recommendations:     Discharge therapy intensity: Low Intensity Therapy   Discharge Equipment Recommendations:  walker, rolling  Barriers to discharge:       Assessment:     Naheed Catrer is a 49 y.o. female with a medical diagnosis of CVA w/u, L side weakness, htn.  She presents with the following performance deficits affecting function: weakness, impaired endurance, impaired sensation, impaired self care skills, impaired functional mobility, gait instability, impaired balance, pain.   Pt lives at home with daughter, indep at baseline, employed as HH nurse. Today, pt c/o increased pain throughout LUE/LLE. Pt c/o increased dizziness at EOB, /91. Pt ambulated around ED room with HHA while limping 2/2 pain LLE. Inconsistencies throughout testing of RUE for strength.     Rehab Prognosis: Good; patient would benefit from acute skilled OT services to address these deficits and reach maximum level of function.       Plan:     Patient to be seen 3 x/week to address the above listed problems via self-care/home management, therapeutic activities, therapeutic exercises, neuromuscular re-education  Plan of Care Expires: 05/31/24  Plan of Care Reviewed with: patient    Subjective     Occupational Profile:  Living Environment: pt lives with 17 yr old daughter. No steps to enter, tub shower   Previous level of function: indep   Roles and Routines: employed as HH nurse, drives   Equipment Used at Home: none  Assistance upon Discharge: pts 17yr old daughter     Pain/Comfort:  Pain Rating 1: 8/10  Location - Side 1: Left  Location 1: arm  Pain Addressed 1: Reposition    Patients cultural, spiritual, Scientology conflicts given the current situation:      Objective:     OT communicated with nrsg prior to session.      Patient was found HOB elevated  with   upon OT entry to room.    General Precautions: Standard, fall  Orthopedic Precautions:    Braces:      Vital Signs: Blood Pressure: 132/85  HR: 88  With Activity: 154/91    Bed Mobility:    Patient completed Supine to Sit with stand by assistance  Patient completed Sit to Supine with stand by assistance    Functional Mobility/Transfers:  Patient completed Sit <> Stand Transfer with contact guard assistance  with  hand-held assist and rolling walker   Functional Mobility: initially used RW, no LOB, discontinued RW and pt limping, required CGA, no LOB     Activities of Daily Living:  Upper Body Dressing: moderate assistance    Lower Body Dressing: stand by assistance to don BLE socks at EOB   Toileting: contact guard assistance to BSC       Functional Cognition:  Affect: Lethargic  Orientation: oriented to Person, Place, Time, and Situation    Visual Perceptual Skills:  Redness in bilateral eyes, however, no deficits observed with tracking, denies blurry vision     Upper Extremity Function:  Right Upper Extremity:   WFL    Left Upper Extremity:  Strength: limited 2/2 increased pain with shoulder flexion 2/5 shoulder; digits 3/5 ; several inconsistencies throughout testing   Coordination: WFL  Sensation: pt reports tingling to proximal LUE      Balance:   Dynamic standing balance:CGA     Therapeutic Positioning  Risk for acquired pressure injuries is decreased due to ability to get to BSC/toilet with assist.        Patient Education:  Patient provided with verbal education education regarding OT role/goals/POC, fall prevention, and safety awareness.  Understanding was verbalized.     Patient left HOB elevated with all lines intact, call button in reach, and transport arrived in room to bring pt to floor room from ED  present.    GOALS:   Multidisciplinary Problems       Occupational Therapy Goals          Problem: Occupational Therapy    Goal Priority Disciplines Outcome Interventions   Occupational Therapy Goal      OT, PT/OT Progressing    Description: Goals to be met by: in 1 month      Patient will increase functional independence with ADLs by performing:    UE Dressing with Modified [place].  LE Dressing with Modified [place] and Assistive Devices as needed.  Grooming while standing with Modified [place].  Toileting from toilet with Modified [place] and Assistive Devices as needed for hygiene and clothing management.   Toilet transfer to toilet with Modified [place].  Increased functional strength to WNL for LUE.                         History:     Past Medical History:   Diagnosis Date    PAD (peripheral artery disease)        No past surgical history on file.    Time Tracking:     OT Date of Treatment:    OT Start Time: 0951  OT Stop Time: 1010  OT Total Time (min): 19 min    Billable Minutes:Evaluation Moderate Complexity     5/1/2024

## 2024-05-01 NOTE — PLAN OF CARE
Problem: Physical Therapy  Goal: Physical Therapy Goal  Description: Goals to be met by: 24     Patient will increase functional independence with mobility by performin. Gait  x 200 feet with Modified [place] using Rolling Walker.     Outcome: Progressing

## 2024-05-01 NOTE — PT/OT/SLP EVAL
Physical Therapy Evaluation    Patient Name:  Naheed Carter   MRN:  [id number]    Recommendations:     Discharge therapy intensity: Low Intensity Therapy   Discharge Equipment Recommendations: walker, rolling   Barriers to discharge: Impaired mobility    Assessment:     Naheed Carter is a 49 y.o. female admitted with a medical diagnosis of CVA workup, L-sided weakness/numbness. She presents with the following impairments/functional limitations: weakness, impaired endurance, impaired functional mobility, gait instability, impaired balance, decreased lower extremity function, decreased safety awareness. Patient tolerated PT evaluation well, mobilizing with Felicita for transfers and gait. Patient with inconsistent presentation of LLE weakness during strength testing, no knee buckling in standing however altered gait pattern with shortened step length and lateral trunk sway. Pt is appropriate for continued acute PT services.     Rehab Prognosis: Good; patient would benefit from acute skilled PT services to address these deficits and reach maximum level of function.    Recent Surgery: * No surgery found *      Plan:     During this hospitalization, patient would benefit from acute PT services 6 x/week to address the identified rehab impairments via gait training, therapeutic activities, therapeutic exercises, neuromuscular re-education and progress toward the following goals:    Plan of Care Expires:  05/30/24    Subjective     Chief Complaint: weakness  Patient/Family Comments/goals: to get stronger  Pain/Comfort:  Pain Rating 1: 0/10    Patients cultural, spiritual, Alevism conflicts given the current situation: no    Living Environment:  Pt lives with daughter in [facility].  Prior to admission, patients level of function was independent.  Equipment used at home: none.  DME owned (not currently used): none.  Upon discharge, patient will have assistance from self.    Objective:     Communicated with RN prior to  session. Patient found HOB elevated with telemetry  upon PT entry to room.    General Precautions: Standard, fall  Orthopedic Precautions:N/A   Braces: N/A  Respiratory Status: Room air  Blood Pressure: 142/86, HR: 81      Exams:  Cognitive Exam:  Patient is oriented to Person, Place, Time, and Situation  Sensation: -       Impaired  c/o numbness/tingling to LLE  RLE ROM: WNL  RLE Strength: WNL  LLE ROM: WNL  LLE Strength: inconsistent effort with testing, grossly 3+ to 4/5.   Skin integrity: Visible skin intact      Functional Mobility:  Bed Mobility:  Supine to Sit: supervision  Transfers:  Sit to Stand:  minimum assistance with hand-held assist  Gait: patient ambulated 30ft with step-to gait pattern, HHA, lateral trunk sway and shortened step length on LLE; no overt LOB or knee buckling      AM-PAC 6 CLICK MOBILITY  Total Score:20       Treatment & Education:    Patient provided with verbal education education regarding PT role/goals/POC, fall prevention, and safety awareness.  Understanding was verbalized.     Patient left HOB elevated with all lines intact, call button in reach, and RN notified.    GOALS:   Multidisciplinary Problems       Physical Therapy Goals          Problem: Physical Therapy    Goal Priority Disciplines Outcome Goal Variances Interventions   Physical Therapy Goal     PT, PT/OT Progressing     Description: Goals to be met by: 24     Patient will increase functional independence with mobility by performin. Gait  x 200 feet with Modified [place] using Rolling Walker.                          History:     Past Medical History:   Diagnosis Date    PAD (peripheral artery disease)        No past surgical history on file.    Time Tracking:     PT Received On: 24  PT Start Time: 1330     PT Stop Time: 1345  PT Total Time (min): 15 min     Billable Minutes: Evaluation Mod complexity      2024

## 2024-05-01 NOTE — SUBJECTIVE & OBJECTIVE
Past Medical History:   Diagnosis Date    PAD (peripheral artery disease)        No past surgical history on file.    Review of patient's allergies indicates:   Allergen Reactions    Penicillins        Current Neurological Medications:     Current Facility-Administered Medications   Medication Dose Route Frequency Provider Last Rate Last Admin    acetaminophen tablet 650 mg  650 mg Oral Q6H PRN Neida Thornton, FNP        aspirin suppository 300 mg  300 mg Rectal Daily Neida Thornton, FNP        bisacodyL suppository 10 mg  10 mg Rectal Daily PRN Neida Thornton, FNP        enoxaparin injection 40 mg  40 mg Subcutaneous Daily Neida Thornton L, FNP        hydrALAZINE injection 10 mg  10 mg Intravenous Q6H PRN Neida Thornton, FNP        ondansetron disintegrating tablet 4 mg  4 mg Oral ED 1 Time Kelly Onofre MD        ondansetron injection 4 mg  4 mg Intravenous Q4H PRN Neida Thornton, FNP        prochlorperazine injection Soln 5 mg  5 mg Intravenous Q6H PRN Neida Thornton, FNP        sodium chloride 0.9% flush 10 mL  10 mL Intravenous PRN Neida Thornton, FNP         No current outpatient medications on file.     Family History    None       Tobacco Use    Smoking status: Not on file    Smokeless tobacco: Not on file   Substance and Sexual Activity    Alcohol use: Not on file    Drug use: Not on file    Sexual activity: Not on file     Review of Systems   All other systems reviewed and are negative.    Objective:     Vital Signs (Most Recent):  Temp: 97.7 °F (36.5 °C) (05/01/24 0448)  Pulse: 87 (05/01/24 0616)  Resp: (!) 24 (05/01/24 0616)  BP: (!) 166/132 (05/01/24 0616)  SpO2: 100 % (05/01/24 0616) Vital Signs (24h Range):  Temp:  [97.7 °F (36.5 °C)] 97.7 °F (36.5 °C)  Pulse:  [85-97] 87  Resp:  [14-24] 24  SpO2:  [98 %-100 %] 100 %  BP: (143-182)/() 166/132     Weight: 82 kg (180 lb 12.4 oz)  Body mass index is 34.16 kg/m².     Physical Exam  Vitals reviewed.   Constitutional:        Appearance: Normal appearance.   HENT:      Head: Normocephalic and atraumatic.      Nose: Nose normal.      Mouth/Throat:      Mouth: Mucous membranes are dry.   Eyes:      Extraocular Movements: Extraocular movements intact.      Pupils: Pupils are equal, round, and reactive to light.   Cardiovascular:      Rate and Rhythm: Normal rate.   Pulmonary:      Effort: Pulmonary effort is normal.   Abdominal:      Palpations: Abdomen is soft.   Musculoskeletal:         General: Normal range of motion.      Cervical back: Normal range of motion.   Skin:     General: Skin is warm and dry.      Capillary Refill: Capillary refill takes less than 2 seconds.   Neurological:      Mental Status: She is alert and oriented to person, place, and time.      Cranial Nerves: Cranial nerve deficit present.      Sensory: Sensory deficit (left upper and lower extremities) present.      Motor: Weakness (2/5 LUE, LLE, 5/5 RUE RLE) present.   Psychiatric:         Mood and Affect: Mood normal.         Behavior: Behavior normal.          NEUROLOGICAL EXAMINATION:     MENTAL STATUS   Oriented to person, place, and time.     CRANIAL NERVES     CN III, IV, VI   Pupils are equal, round, and reactive to light.      Significant Labs: BMP:   Recent Labs   Lab 05/01/24  0500      K 4.1   CO2 15*   BUN 6.1*   CREATININE 0.78   CALCIUM 9.8     CBC:   Recent Labs   Lab 05/01/24  0500   WBC 7.17   HGB 10.2*   HCT 33.3*   *     CMP:   Recent Labs   Lab 05/01/24  0500      K 4.1   CO2 15*   BUN 6.1*   CREATININE 0.78   CALCIUM 9.8   ALBUMIN 4.1   BILITOT 0.3   ALKPHOS 111   AST 26   ALT 15       Significant Imaging: I have reviewed all pertinent imaging results/findings within the past 24 hours.

## 2024-05-01 NOTE — ASSESSMENT & PLAN NOTE
Stroke   - presented with left upper and lower extremity weakness and parathesia  - Stroke RF: ? HTN  - Intervention: No TNK, patient declined.  - Etiology: TBD    Stroke workup:  -CTh: negative   -CTA h/n: negative   -MRI brain:  unofficial read by Neurology, negative for infarct.  -ECHO:    -CUS:   -LDL:    -A1c:  5.5  -TSH:  1.655  -home medications include: ASA 81 mg daily    Unofficial read of MRI is negative for infarct. Patient did not wish to take TNK after discussion with Neurologist.    Plan:  -continue stroke workup  -Add Aspirin 81mg daily  -Add Atorvastatin 40mg daily  -ECHO, with BS,  CUS, and lipid panel pending.  -PT/OT/ST to evaluate     Further recommendations may follow per MD.

## 2024-05-01 NOTE — CONSULTS
[facility] - Emergency Dept  Neurology  Consult Note    Patient Name: Naheed Carter  MRN: [id number]  Admission Date: 5/1/2024  Hospital Length of Stay: 0 days  Code Status: Full Code   Attending Provider: Kelly Onofre MD   Consulting Provider: Marnie Sánchez NP  Primary Care Physician: Awais Lomas MD  Principal Problem:<principal problem not specified>    Inpatient consult to Neurology  Consult performed by: Marnie Sánchez NP  Consult ordered by: Neida Thornton FNP         Subjective:     Chief Complaint:    Chief Complaint   Patient presents with    Cerebrovascular Accident     EMS states pt went to bed between 3516-2464, woke up this am with left sided weakness and facial droop.  en route.          HPI:   Naheed Gray is a 49-year-old female with past medical history of Hypertension, thyroid disease and PVD who presented to the ER with complaints of left sided weakness of upper and lower extremities associated with numbness and tingling. LKN was around 2300 last night before going to bed. She awoke around 4 am this morning, and went to the bathroom and had to grab hold of the wall due to weakness. She got to her recliner and called 911. CT head and CTA H?n unremarkable. Stat MRI ordered for possible wake up stroke protocol. Imaging pending. Patient claustraphobic and MRI delayed due to the need for anxiolytic. NIH 5.      Past Medical History:   Diagnosis Date    PAD (peripheral artery disease)        No past surgical history on file.    Review of patient's allergies indicates:   Allergen Reactions    Penicillins        Current Neurological Medications:     Current Facility-Administered Medications   Medication Dose Route Frequency Provider Last Rate Last Admin    acetaminophen tablet 650 mg  650 mg Oral Q6H PRN Neida Thornton FNP        aspirin suppository 300 mg  300 mg Rectal Daily Neida Thornton FNP        bisacodyL suppository 10 mg  10 mg Rectal  Daily PRN HillaryNeida L, FNP        enoxaparin injection 40 mg  40 mg Subcutaneous Daily Ramiro Thorntony L, FNP        hydrALAZINE injection 10 mg  10 mg Intravenous Q6H PRN HillaryNeida, FNP        ondansetron disintegrating tablet 4 mg  4 mg Oral ED 1 Time Kelly Onofre MD        ondansetron injection 4 mg  4 mg Intravenous Q4H PRN Neida Thornton L, FNP        prochlorperazine injection Soln 5 mg  5 mg Intravenous Q6H PRN ChrisNeida alvarez L, FNP        sodium chloride 0.9% flush 10 mL  10 mL Intravenous PRN HillaryNeida L, FNP         No current outpatient medications on file.     Family History    None       Tobacco Use    Smoking status: Not on file    Smokeless tobacco: Not on file   Substance and Sexual Activity    Alcohol use: Not on file    Drug use: Not on file    Sexual activity: Not on file     Review of Systems   All other systems reviewed and are negative.    Objective:     Vital Signs (Most Recent):  Temp: 97.7 °F (36.5 °C) (05/01/24 0448)  Pulse: 87 (05/01/24 0616)  Resp: (!) 24 (05/01/24 0616)  BP: (!) 166/132 (05/01/24 0616)  SpO2: 100 % (05/01/24 0616) Vital Signs (24h Range):  Temp:  [97.7 °F (36.5 °C)] 97.7 °F (36.5 °C)  Pulse:  [85-97] 87  Resp:  [14-24] 24  SpO2:  [98 %-100 %] 100 %  BP: (143-182)/() 166/132     Weight: 82 kg (180 lb 12.4 oz)  Body mass index is 34.16 kg/m².     Physical Exam  Vitals reviewed.   Constitutional:       Appearance: Normal appearance.   HENT:      Head: Normocephalic and atraumatic.      Nose: Nose normal.      Mouth/Throat:      Mouth: Mucous membranes are dry.   Eyes:      Extraocular Movements: Extraocular movements intact.      Pupils: Pupils are equal, round, and reactive to light.   Cardiovascular:      Rate and Rhythm: Normal rate.   Pulmonary:      Effort: Pulmonary effort is normal.   Abdominal:      Palpations: Abdomen is soft.   Musculoskeletal:         General: Normal range of motion.      Cervical back: Normal range of motion.    Skin:     General: Skin is warm and dry.      Capillary Refill: Capillary refill takes less than 2 seconds.   Neurological:      Mental Status: She is alert and oriented to person, place, and time.      Cranial Nerves: Cranial nerve deficit present.      Sensory: Sensory deficit (left upper and lower extremities) present.      Motor: Weakness (2/5 LUE, LLE, 5/5 RUE RLE) present.   Psychiatric:         Mood and Affect: Mood normal.         Behavior: Behavior normal.          NEUROLOGICAL EXAMINATION:     MENTAL STATUS   Oriented to person, place, and time.     CRANIAL NERVES     CN III, IV, VI   Pupils are equal, round, and reactive to light.      Significant Labs: BMP:   Recent Labs   Lab 05/01/24  0500      K 4.1   CO2 15*   BUN 6.1*   CREATININE 0.78   CALCIUM 9.8     CBC:   Recent Labs   Lab 05/01/24  0500   WBC 7.17   HGB 10.2*   HCT 33.3*   *     CMP:   Recent Labs   Lab 05/01/24  0500      K 4.1   CO2 15*   BUN 6.1*   CREATININE 0.78   CALCIUM 9.8   ALBUMIN 4.1   BILITOT 0.3   ALKPHOS 111   AST 26   ALT 15       Significant Imaging: I have reviewed all pertinent imaging results/findings within the past 24 hours.  Assessment and Plan:     Stroke  Stroke   - presented with left upper and lower extremity weakness and parathesia  - Stroke RF: ? HTN  - Intervention: No TNK, patient declined.  - Etiology: TBD    Stroke workup:  -CTh: negative   -CTA h/n: negative   -MRI brain:  unofficial read by Neurology, negative for infarct.  -ECHO:    -CUS:   -LDL:    -A1c:  5.5  -TSH:  1.655  -home medications include: ASA 81 mg daily    Unofficial read of MRI is negative for infarct. Patient did not wish to take TNK after discussion with Neurologist.    Plan:  -continue stroke workup  -Add Aspirin 81mg daily  -Add Atorvastatin 40mg daily  -ECHO, with BS,  CUS, and lipid panel pending.  -PT/OT/ST to evaluate     Further recommendations may follow per MD.            VTE Risk Mitigation (From admission,  onward)           Ordered     enoxaparin injection 40 mg  Daily         05/01/24 0608     IP VTE HIGH RISK PATIENT  Once         05/01/24 0608     Place sequential compression device  Until discontinued         05/01/24 0608                    Thank you for your consult.  Will follow up with patient    Marnie Sánchez NP  Neurology  [facility] - Emergency Dept

## 2024-05-02 LAB
ANION GAP SERPL CALC-SCNC: 22 MMOL/L (ref 8–16)
AV INDEX (PROSTH): 0.65
AV MEAN GRADIENT: 5 MMHG
AV PEAK GRADIENT: 10 MMHG
AV VELOCITY RATIO: 0.69
BSA FOR ECHO PROCEDURE: 1.88 M2
BUN SERPL-MCNC: 5 MG/DL (ref 6–30)
CHLORIDE SERPL-SCNC: 106 MMOL/L (ref 95–110)
CHOLEST SERPL-MCNC: 149 MG/DL
CHOLEST/HDLC SERPL: 5 {RATIO} (ref 0–5)
CREAT SERPL-MCNC: 0.8 MG/DL (ref 0.5–1.4)
CV ECHO LV RWT: 0.55 CM
DOP CALC AO PEAK VEL: 1.56 M/S
DOP CALC AO VTI: 27.5 CM
DOP CALC LVOT PEAK VEL: 1.08 M/S
DOP CALCLVOT PEAK VEL VTI: 17.8 CM
E WAVE DECELERATION TIME: 180 MSEC
E/A RATIO: 0.85
E/E' RATIO: 9 M/S
ECHO LV POSTERIOR WALL: 1.1 CM (ref 0.6–1.1)
FRACTIONAL SHORTENING: 50 % (ref 28–44)
GLUCOSE SERPL-MCNC: 136 MG/DL (ref 70–110)
HCT VFR BLD CALC: 36 %PCV (ref 36–54)
HDLC SERPL-MCNC: 33 MG/DL (ref 35–60)
HGB BLD-MCNC: 12 G/DL
INTERVENTRICULAR SEPTUM: 1.3 CM (ref 0.6–1.1)
LDLC SERPL CALC-MCNC: 96 MG/DL (ref 50–140)
LEFT ATRIUM SIZE: 2.8 CM
LEFT ATRIUM VOLUME INDEX MOD: 26 ML/M2
LEFT ATRIUM VOLUME MOD: 47.1 CM3
LEFT INTERNAL DIMENSION IN SYSTOLE: 2 CM (ref 2.1–4)
LEFT VENTRICLE DIASTOLIC VOLUME INDEX: 38.67 ML/M2
LEFT VENTRICLE DIASTOLIC VOLUME: 70 ML
LEFT VENTRICLE MASS INDEX: 91 G/M2
LEFT VENTRICLE SYSTOLIC VOLUME INDEX: 7 ML/M2
LEFT VENTRICLE SYSTOLIC VOLUME: 12.7 ML
LEFT VENTRICULAR INTERNAL DIMENSION IN DIASTOLE: 4 CM (ref 3.5–6)
LEFT VENTRICULAR MASS: 165.46 G
LV LATERAL E/E' RATIO: 7.88 M/S
LV SEPTAL E/E' RATIO: 10.5 M/S
LVOT MG: 2 MMHG
LVOT MV: 0.7 CM/S
MV PEAK A VEL: 0.74 M/S
MV PEAK E VEL: 0.63 M/S
OHS LV EJECTION FRACTION SIMPSONS BIPLANE MOD: 65 %
OHS QRS DURATION: 82 MS
OHS QTC CALCULATION: 481 MS
POC IONIZED CALCIUM: 1.15 MMOL/L (ref 1.06–1.42)
POC TCO2 (MEASURED): 21 MMOL/L (ref 23–27)
POTASSIUM BLD-SCNC: 3.5 MMOL/L (ref 3.5–5.1)
PV PEAK GRADIENT: 4 MMHG
PV PEAK VELOCITY: 0.95 M/S
RA PRESSURE ESTIMATED: 3 MMHG
SAMPLE: ABNORMAL
SODIUM BLD-SCNC: 144 MMOL/L (ref 136–145)
TDI LATERAL: 0.08 M/S
TDI SEPTAL: 0.06 M/S
TDI: 0.07 M/S
TRICUSPID ANNULAR PLANE SYSTOLIC EXCURSION: 2.29 CM
TRIGL SERPL-MCNC: 99 MG/DL (ref 37–140)
VLDLC SERPL CALC-MCNC: 20 MG/DL
Z-SCORE OF LEFT VENTRICULAR DIMENSION IN END DIASTOLE: -2.25
Z-SCORE OF LEFT VENTRICULAR DIMENSION IN END SYSTOLE: -3.45

## 2024-05-02 PROCEDURE — 25000003 PHARM REV CODE 250: Performed by: NURSE PRACTITIONER

## 2024-05-02 PROCEDURE — 21400001 HC TELEMETRY ROOM

## 2024-05-02 PROCEDURE — 80061 LIPID PANEL: CPT | Performed by: NURSE PRACTITIONER

## 2024-05-02 PROCEDURE — 63600175 PHARM REV CODE 636 W HCPCS: Performed by: NURSE PRACTITIONER

## 2024-05-02 PROCEDURE — 36415 COLL VENOUS BLD VENIPUNCTURE: CPT | Performed by: NURSE PRACTITIONER

## 2024-05-02 PROCEDURE — 25000003 PHARM REV CODE 250: Performed by: HOSPITALIST

## 2024-05-02 RX ORDER — LISINOPRIL 10 MG/1
10 TABLET ORAL DAILY
Status: DISCONTINUED | OUTPATIENT
Start: 2024-05-03 | End: 2024-05-03

## 2024-05-02 RX ADMIN — ONDANSETRON 4 MG: 2 INJECTION INTRAMUSCULAR; INTRAVENOUS at 10:05

## 2024-05-02 RX ADMIN — ASPIRIN 81 MG CHEWABLE TABLET 81 MG: 81 TABLET CHEWABLE at 09:05

## 2024-05-02 RX ADMIN — ACETAMINOPHEN 325MG 650 MG: 325 TABLET ORAL at 09:05

## 2024-05-02 RX ADMIN — TRAMADOL HYDROCHLORIDE 50 MG: 50 TABLET, COATED ORAL at 11:05

## 2024-05-02 RX ADMIN — Medication 6 MG: at 11:05

## 2024-05-02 RX ADMIN — TRAMADOL HYDROCHLORIDE 50 MG: 50 TABLET, COATED ORAL at 05:05

## 2024-05-02 RX ADMIN — ENOXAPARIN SODIUM 40 MG: 40 INJECTION SUBCUTANEOUS at 05:05

## 2024-05-02 RX ADMIN — ATORVASTATIN CALCIUM 40 MG: 40 TABLET, FILM COATED ORAL at 09:05

## 2024-05-02 RX ADMIN — TRAMADOL HYDROCHLORIDE 50 MG: 50 TABLET, COATED ORAL at 10:05

## 2024-05-02 RX ADMIN — TRAMADOL HYDROCHLORIDE 50 MG: 50 TABLET, COATED ORAL at 04:05

## 2024-05-02 NOTE — PT/OT/SLP PROGRESS
Physical Therapy Treatment    Patient Name:  Naheed Carter   MRN:  [id number]    Attempted treatment twice but she reports hypertension with N&V. Upon 2 nd attempt she stated that she just finished vomiting everywhere. I will attempt is schedule permits.

## 2024-05-02 NOTE — PLAN OF CARE
Problem: Adult Inpatient Plan of Care  Goal: Plan of Care Review  Outcome: Not Progressing  Goal: Patient-Specific Goal (Individualized)  Outcome: Not Progressing  Goal: Absence of Hospital-Acquired Illness or Injury  Outcome: Not Progressing  Goal: Optimal Comfort and Wellbeing  Outcome: Not Progressing  Goal: Readiness for Transition of Care  Outcome: Not Progressing     Problem: Infection  Goal: Absence of Infection Signs and Symptoms  Outcome: Not Progressing     Problem: Stroke, Ischemic (Includes Transient Ischemic Attack)  Goal: Optimal Coping  Outcome: Not Progressing  Goal: Effective Bowel Elimination  Outcome: Not Progressing  Goal: Optimal Cerebral Tissue Perfusion  Outcome: Not Progressing  Goal: Optimal Cognitive Function  Outcome: Not Progressing  Goal: Improved Communication Skills  Outcome: Not Progressing  Goal: Optimal Functional Ability  Outcome: Not Progressing  Goal: Optimal Nutrition Intake  Outcome: Not Progressing  Goal: Effective Oxygenation and Ventilation  Outcome: Not Progressing  Goal: Improved Sensorimotor Function  Outcome: Not Progressing  Goal: Safe and Effective Swallow  Outcome: Not Progressing  Goal: Effective Urinary Elimination  Outcome: Not Progressing     Problem: Skin Injury Risk Increased  Goal: Skin Health and Integrity  Outcome: Not Progressing

## 2024-05-02 NOTE — PT/OT/SLP PROGRESS
Attempted to see pt for OT session this PM however declined to participate 2/2 nausea/vomiting. Will f/u as schedule permits.

## 2024-05-02 NOTE — PLAN OF CARE
Problem: Adult Inpatient Plan of Care  Goal: Plan of Care Review  Outcome: Progressing  Flowsheets (Taken 5/1/2024 2212)  Plan of Care Reviewed With: patient     Problem: Adult Inpatient Plan of Care  Goal: Optimal Comfort and Wellbeing  Outcome: Progressing  Intervention: Monitor Pain and Promote Comfort  Flowsheets (Taken 5/1/2024 2212)  Pain Management Interventions:   quiet environment facilitated   care clustered   medication offered   pain management plan reviewed with patient/caregiver   relaxation techniques promoted  Intervention: Provide Person-Centered Care  Flowsheets (Taken 5/1/2024 2212)  Trust Relationship/Rapport:   care explained   questions answered   choices provided   questions encouraged   emotional support provided   reassurance provided   empathic listening provided   thoughts/feelings acknowledged     Problem: Stroke, Ischemic (Includes Transient Ischemic Attack)  Goal: Optimal Coping  Outcome: Progressing     Problem: Skin Injury Risk Increased  Goal: Skin Health and Integrity  Outcome: Progressing

## 2024-05-03 LAB
LEFT CCA DIST DIAS: 18 CM/S
LEFT CCA DIST SYS: 73 CM/S
LEFT CCA PROX DIAS: 25 CM/S
LEFT CCA PROX SYS: 100 CM/S
LEFT ECA DIAS: 19 CM/S
LEFT ECA SYS: 100 CM/S
LEFT ICA DIST DIAS: 29 CM/S
LEFT ICA DIST SYS: 85 CM/S
LEFT ICA MID DIAS: 26 CM/S
LEFT ICA MID SYS: 72 CM/S
LEFT ICA PROX DIAS: 18 CM/S
LEFT ICA PROX SYS: 60 CM/S
LEFT VERTEBRAL DIAS: 0 CM/S
LEFT VERTEBRAL SYS: 72 CM/S
OHS CV CAROTID RIGHT ICA EDV HIGHEST: 37
OHS CV CAROTID ULTRASOUND LEFT ICA/CCA RATIO: 1.16
OHS CV CAROTID ULTRASOUND RIGHT ICA/CCA RATIO: 1.34
OHS CV PV CAROTID LEFT HIGHEST CCA: 100
OHS CV PV CAROTID LEFT HIGHEST ICA: 85
OHS CV PV CAROTID RIGHT HIGHEST CCA: 99
OHS CV PV CAROTID RIGHT HIGHEST ICA: 98
OHS CV US CAROTID LEFT HIGHEST EDV: 29
RIGHT CCA DIST DIAS: 19 CM/S
RIGHT CCA DIST SYS: 73 CM/S
RIGHT CCA PROX DIAS: 14 CM/S
RIGHT CCA PROX SYS: 99 CM/S
RIGHT ECA DIAS: 7 CM/S
RIGHT ECA SYS: 62 CM/S
RIGHT ICA DIST DIAS: 34 CM/S
RIGHT ICA DIST SYS: 92 CM/S
RIGHT ICA MID DIAS: 37 CM/S
RIGHT ICA MID SYS: 98 CM/S
RIGHT ICA PROX DIAS: 20 CM/S
RIGHT ICA PROX SYS: 63 CM/S
RIGHT VERTEBRAL DIAS: 29 CM/S
RIGHT VERTEBRAL SYS: 100 CM/S

## 2024-05-03 PROCEDURE — 25000003 PHARM REV CODE 250: Performed by: HOSPITALIST

## 2024-05-03 PROCEDURE — 25000003 PHARM REV CODE 250: Performed by: NURSE PRACTITIONER

## 2024-05-03 PROCEDURE — 21400001 HC TELEMETRY ROOM

## 2024-05-03 PROCEDURE — 97116 GAIT TRAINING THERAPY: CPT | Mod: CQ

## 2024-05-03 PROCEDURE — 63600175 PHARM REV CODE 636 W HCPCS: Performed by: NURSE PRACTITIONER

## 2024-05-03 PROCEDURE — 25000003 PHARM REV CODE 250: Performed by: INTERNAL MEDICINE

## 2024-05-03 PROCEDURE — 97535 SELF CARE MNGMENT TRAINING: CPT

## 2024-05-03 RX ORDER — LISINOPRIL 20 MG/1
20 TABLET ORAL DAILY
Status: DISCONTINUED | OUTPATIENT
Start: 2024-05-04 | End: 2024-05-04

## 2024-05-03 RX ORDER — LISINOPRIL 10 MG/1
10 TABLET ORAL ONCE
Status: COMPLETED | OUTPATIENT
Start: 2024-05-03 | End: 2024-05-03

## 2024-05-03 RX ADMIN — Medication 6 MG: at 08:05

## 2024-05-03 RX ADMIN — LISINOPRIL 10 MG: 10 TABLET ORAL at 01:05

## 2024-05-03 RX ADMIN — ASPIRIN 81 MG CHEWABLE TABLET 81 MG: 81 TABLET CHEWABLE at 08:05

## 2024-05-03 RX ADMIN — ATORVASTATIN CALCIUM 40 MG: 40 TABLET, FILM COATED ORAL at 08:05

## 2024-05-03 RX ADMIN — ENOXAPARIN SODIUM 40 MG: 40 INJECTION SUBCUTANEOUS at 04:05

## 2024-05-03 RX ADMIN — LISINOPRIL 10 MG: 10 TABLET ORAL at 08:05

## 2024-05-03 RX ADMIN — ACETAMINOPHEN 325MG 650 MG: 325 TABLET ORAL at 08:05

## 2024-05-03 NOTE — PLAN OF CARE
Pt states Awais Lomas was her PCP at [facility] 4 years ago. She states she has recently moved back to [facility] and needs a PCP and would like to go back to [facility]. Referral sent via [facility] to [facility].  I notified [facility] of change in PCP status.  I called [facility] to deliver RW to pt room.

## 2024-05-03 NOTE — PROGRESS NOTES
[facility] Hospital Medicine Progress Note        Chief Complaint: Inpatient Follow-up for Cerebrovascular Accident (EMS states pt went to bed between 0805-9101, woke up this am with left sided weakness and facial droop.  en route.)    HPI: Naheed Carter is a 49 y.o. female who  has a past medical history of PAD (peripheral artery disease).. The patient presented to [facility] on 5/1/2024      49-year-old female who presents to the emergency room on 05/01/2024 after acute onset of left-sided weakness. This  was associated with mild aphasia and left-sided facial drooping.  Reports woke up around 4:00 a.m. to use the restroom and noted weakness.  Nearly fell.  Called EMS and was transported to the hospital.  On admission she was noted to be mildly hypertensive.  On room air.  Labs unremarkable.  CT of the head showed no acute intracranial abnormalities.  She has just gone for her MRI which is pending.  Stroke team has already been notified.  She was not a candidate for TNK administration.  She was risk factors of hypertension.  She was on an aspirin but he was not take it regularly.  No other anticoagulation at home.  She was not on a statin    Interval Hx:     5/2/24 dr de anda - bp elevated and pt was throwing up. Initiated on lisinopril 10 mgs po daily. Continue asa/ lipitor     5/3/24 dr luoann lowry - complains of headaches, initiated on lisinopril 10 mgs this am, already titrated to 20 mgs  daily. Will monitor  for possible discharge home in am     LLE weakness w paresthesias             Case was discussed with patient's nurse and  on the floor.  Objective/physical exam:  General appearance: Well-developed, well-nourished female in no apparent distress.  HENT: Atraumatic head. Moist mucous membranes of oral cavity.  Eyes: Normal extraocular movements.   Neck: Supple.   Lungs: Clear to auscultation bilaterally. No wheezing present.   Heart: Regular rate and rhythm. S1  and S2 present with no murmurs/gallop/rub. No pedal edema. No JVD present.   Abdomen: Soft, non-distended, non-tender. No rebound tenderness/guarding. Bowel sounds are normal.   Extremities: No cyanosis, clubbing, or edema.  Skin: No Rash.   Neuro: Motor and sensory exams grossly intact. Good tone. Muscle strength 5/5 in all 4 extremities  Psych/mental status: Appropriate mood and affect. Responds appropriately to questions.           Stroke workup:  -CTh: negative   -CTA h/n: negative   -MRI brain: negative(please look under chart review/imaging)  -ECHO:  Pef/ DD grade 1  -CUS: pending   -LDL:96    -A1c:  5.5  -TSH:  1.655  -home medications include: ASA 81 mg daily/lipitor added at 40 mgs     New onset htn - lisinopril increased to 10 mgs po daily       Plan - needs better bp control/ plans for discharge tomorrow with home health/ DME and referral to aleksandra vega  on asa/lipitor and lisinopril             VITAL SIGNS: 24 HRS MIN & MAX LAST   Temp  Min: 97.1 °F (36.2 °C)  Max: 97.7 °F (36.5 °C) 97.5 °F (36.4 °C)   BP  Min: 135/64  Max: 163/90 (!) 146/86   Pulse  Min: 54  Max: 65  (!) 54   Resp  Min: 18  Max: 18 18   SpO2  Min: 94 %  Max: 99 % 99 %     I have reviewed the following labs:  Recent Labs   Lab 05/01/24  0500 05/01/24  0521   WBC 7.17  --    RBC 4.14*  --    HGB 10.2*  --    HCT 33.3* 36   MCV 80.4  --    MCH 24.6*  --    MCHC 30.6*  --    RDW 16.7  --    *  --    MPV 9.7  --      Recent Labs   Lab 05/01/24  0500      K 4.1   CO2 15*   BUN 6.1*   CREATININE 0.78   CALCIUM 9.8   ALBUMIN 4.1   ALKPHOS 111   ALT 15   AST 26   BILITOT 0.3     Microbiology Results (last 7 days)       ** No results found for the last 168 hours. **             See below for Radiology    Scheduled Med:  Current Facility-Administered Medications   Medication Dose Route Frequency    aspirin  81 mg Oral Daily    atorvastatin  40 mg Oral Daily    enoxparin  40 mg Subcutaneous Daily    lisinopriL  10 mg Oral Once    [START ON  5/4/2024] lisinopriL  20 mg Oral Daily      Continuous Infusions:  Current Facility-Administered Medications   Medication Dose Route Frequency Last Rate Last Admin      PRN Meds:    Current Facility-Administered Medications:     acetaminophen, 650 mg, Oral, Q6H PRN    bisacodyL, 10 mg, Rectal, Daily PRN    hydrALAZINE, 10 mg, Intravenous, Q6H PRN    melatonin, 6 mg, Oral, Nightly PRN    ondansetron, 4 mg, Intravenous, Q4H PRN    prochlorperazine, 5 mg, Intravenous, Q6H PRN    sodium chloride 0.9%, 10 mL, Intravenous, PRN     Assessment/Plan:  CVA workup negative   - tia?  - needs to be complioant with baby asa    New onset htn  - lisinopril 20 mgs po daily     Left-sided weakness  - resolving     Hld  - placed on statin on this admission   -lipitor 40 mgs po daily              VTE prophylaxis: lovenox     Patient condition:  Stable    Anticipated discharge and Disposition:   low intensity therapy      All diagnosis and differential diagnosis have been reviewed; assessment and plan has been documented; I have personally reviewed the labs and test results that are presently available; I have reviewed the patients medication list; I have reviewed the consulting providers response and recommendations. I have reviewed or attempted to review medical records based upon their availability    All of the patient's questions have been  addressed and answered. Patient's is agreeable to the above stated plan. I will continue to monitor closely and make adjustments to medical management as needed.  _____________________________________________________________________    Nutrition Status:    Radiology:  I have personally reviewed the following imaging and agree with the radiologist.     Echo    Left Ventricle: The left ventricle is normal in size. Mildly increased   wall thickness. There is normal systolic function with a visually   estimated ejection fraction of 60 - 65%. Grade I diastolic dysfunction.    Right Ventricle: Normal  right ventricular cavity size. Systolic   function is normal. TAPSE is 2.29 cm.    IVC/SVC: Normal venous pressure at 3 mmHg.    There was 6 mL of intravenous agitated saline (bubble) used. Study is   negative for shunt.      Leonel Tilley MD  Department of Hospital Medicine   [facility]   05/03/2024

## 2024-05-03 NOTE — PLAN OF CARE
Problem: Adult Inpatient Plan of Care  Goal: Plan of Care Review  5/3/2024 0223 by Naheed Garrett RN  Outcome: Progressing  Flowsheets (Taken 5/3/2024 0223)  Plan of Care Reviewed With: patient  5/3/2024 0217 by Naheed Garrett RN  Outcome: Progressing  Flowsheets (Taken 5/3/2024 0217)  Plan of Care Reviewed With: patient  Goal: Patient-Specific Goal (Individualized)  5/3/2024 0223 by Naheed Garrett RN  Outcome: Progressing  5/3/2024 0217 by Naheed Garrett RN  Outcome: Progressing  Goal: Absence of Hospital-Acquired Illness or Injury  5/3/2024 0223 by Naheed Garrett RN  Outcome: Progressing  5/3/2024 0217 by Naheed Garrett RN  Outcome: Progressing  Intervention: Identify and Manage Fall Risk  5/3/2024 0223 by Naheed Garrett RN  Flowsheets (Taken 5/3/2024 0223)  Safety Promotion/Fall Prevention:   assistive device/personal item within reach   nonskid shoes/socks when out of bed   medications reviewed   Fall Risk reviewed with patient/family   Fall Risk signage in place  5/3/2024 0217 by Nahede Garrett RN  Flowsheets (Taken 5/3/2024 0217)  Safety Promotion/Fall Prevention: assistive device/personal item within reach  Intervention: Prevent Skin Injury  Flowsheets (Taken 5/3/2024 0223)  Body Position: position changed independently  Intervention: Prevent and Manage VTE (Venous Thromboembolism) Risk  Flowsheets (Taken 5/3/2024 0223)  VTE Prevention/Management:   remove, assess skin, and reapply sequential compression device   ambulation promoted   ROM (active) performed   ROM (passive) performed   fluids promoted   bleeding risk assessed   dorsiflexion/plantar flexion performed  Intervention: Prevent Infection  Flowsheets (Taken 5/3/2024 0223)  Infection Prevention:   rest/sleep promoted   hand hygiene promoted   single patient room provided  Goal: Optimal Comfort and Wellbeing  5/3/2024 0223 by Naheed Garrett RN  Outcome: Progressing  5/3/2024 0217 by Naheed Garrett RN  Outcome:  Progressing  Intervention: Monitor Pain and Promote Comfort  Flowsheets (Taken 5/3/2024 0223)  Pain Management Interventions:   care clustered   medication offered but refused   pain management plan reviewed with patient/caregiver  Intervention: Provide Person-Centered Care  Flowsheets (Taken 5/3/2024 0223)  Trust Relationship/Rapport:   reassurance provided   empathic listening provided   care explained   choices provided   emotional support provided   questions answered   thoughts/feelings acknowledged   questions encouraged  Goal: Readiness for Transition of Care  5/3/2024 0223 by Naheed Garrett RN  Outcome: Progressing  5/3/2024 0217 by Naheed Garrett RN  Outcome: Progressing     Problem: Infection  Goal: Absence of Infection Signs and Symptoms  5/3/2024 0223 by Naheed Garrett RN  Outcome: Progressing  5/3/2024 0217 by Naheed Garrett RN  Outcome: Progressing     Problem: Stroke, Ischemic (Includes Transient Ischemic Attack)  Goal: Optimal Coping  5/3/2024 0223 by Naheed Garrett RN  Outcome: Progressing  5/3/2024 0217 by Naheed Garrett RN  Outcome: Progressing  Intervention: Support Psychosocial Response to Stroke  Flowsheets (Taken 5/3/2024 0223)  Supportive Measures:   active listening utilized   verbalization of feelings encouraged  Goal: Effective Bowel Elimination  5/3/2024 0223 by Naheed Garrett RN  Outcome: Progressing  5/3/2024 0217 by Naheed Garrett RN  Outcome: Progressing  Goal: Optimal Cerebral Tissue Perfusion  5/3/2024 0223 by Naheed Garrett RN  Outcome: Progressing  5/3/2024 0217 by Naheed Garrett RN  Outcome: Progressing  Intervention: Protect and Optimize Cerebral Perfusion  Flowsheets (Taken 5/3/2024 0223)  Sensory Stimulation Regulation:   care clustered   quiet environment promoted  Cerebral Perfusion Promotion: blood pressure monitored  Goal: Optimal Cognitive Function  5/3/2024 0223 by Naheed Garrett RN  Outcome: Progressing  5/3/2024 0217 by Tami  KIARA Farfan  Outcome: Progressing  Intervention: Optimize Cognitive Function  Flowsheets (Taken 5/3/2024 0223)  Sensory Stimulation Regulation:   care clustered   quiet environment promoted  Goal: Improved Communication Skills  5/3/2024 0223 by Naheed Garrett RN  Outcome: Progressing  5/3/2024 0217 by Naheed Garrett RN  Outcome: Progressing  Intervention: Optimize Communication Skills  Flowsheets (Taken 5/3/2024 0223)  Communication Enhancement Strategies: call light answered in person  Goal: Optimal Functional Ability  5/3/2024 0223 by Naheed Garrett RN  Outcome: Progressing  5/3/2024 0217 by Naheed Garrett RN  Outcome: Progressing  Goal: Optimal Nutrition Intake  5/3/2024 0223 by Naheed Garrett RN  Outcome: Progressing  5/3/2024 0217 by Naheed Garrett RN  Outcome: Progressing  Goal: Effective Oxygenation and Ventilation  5/3/2024 0223 by Naheed Garrett RN  Outcome: Progressing  5/3/2024 0217 by Naheed Garrett RN  Outcome: Progressing  Goal: Improved Sensorimotor Function  5/3/2024 0223 by Naheed Garrett RN  Outcome: Progressing  5/3/2024 0217 by Naheed Garrett RN  Outcome: Progressing  Intervention: Optimize Range of Motion, Motor Control and Function  Flowsheets (Taken 5/3/2024 0223)  Positioning/Transfer Devices:   pillows   in use  Range of Motion:   active ROM (range of motion) encouraged   ROM (range of motion) performed  Goal: Safe and Effective Swallow  5/3/2024 0223 by Naheed Garrett RN  Outcome: Progressing  5/3/2024 0217 by Naheed Garrett RN  Outcome: Progressing  Intervention: Promote and Optimize Fluid and Food Intake  Flowsheets (Taken 5/3/2024 0223)  Aspiration Precautions:   awake/alert before oral intake   respiratory status monitored   oral hygiene care promoted  Goal: Effective Urinary Elimination  5/3/2024 0223 by Naheed Garrett RN  Outcome: Progressing  5/3/2024 0217 by Naheed Garrett RN  Outcome: Progressing     Problem: Skin Injury Risk  Increased  Goal: Skin Health and Integrity  5/3/2024 0223 by Naheed Garrett RN  Outcome: Progressing  5/3/2024 0217 by Nhaeed Garrett RN  Outcome: Progressing     Problem: Skin Injury Risk Increased  Goal: Skin Health and Integrity  5/3/2024 0223 by Naheed Garrett RN  Outcome: Progressing  5/3/2024 0217 by Naheed Garrett RN  Outcome: Progressing

## 2024-05-03 NOTE — PLAN OF CARE
Pt in agreement with Medicaid assigned . [place] assigned [facility]. Pt Choice obtained and placed in pt chart.  Referral sent to [facility] via [employer].  Pt states her dgt will drive her home at [place].

## 2024-05-03 NOTE — PROGRESS NOTES
[facility] Hospital Medicine Progress Note        Chief Complaint: Inpatient Follow-up for Cerebrovascular Accident (EMS states pt went to bed between 1486-0670, woke up this am with left sided weakness and facial droop.  en route.)    HPI: Naheed Carter is a 49 y.o. female who  has a past medical history of PAD (peripheral artery disease).. The patient presented to [facility] on 5/1/2024      49-year-old female who presents to the emergency room on 05/01/2024 after acute onset of left-sided weakness. This  was associated with mild aphasia and left-sided facial drooping.  Reports woke up around 4:00 a.m. to use the restroom and noted weakness.  Nearly fell.  Called EMS and was transported to the hospital.  On admission she was noted to be mildly hypertensive.  On room air.  Labs unremarkable.  CT of the head showed no acute intracranial abnormalities.  She has just gone for her MRI which is pending.  Stroke team has already been notified.  She was not a candidate for TNK administration.  She was risk factors of hypertension.  She was on an aspirin but he was not take it regularly.  No other anticoagulation at home.  She was not on a statin    Interval Hx:     5/2/24 dr de anda - bp elevated and pt was throwing up. Initiated on lisinopril 10 mgs po daily. Continue asa/ lipitor             Case was discussed with patient's nurse and  on the floor.  Objective/physical exam:  General appearance: Well-developed, well-nourished female in no apparent distress.  HENT: Atraumatic head. Moist mucous membranes of oral cavity.  Eyes: Normal extraocular movements.   Neck: Supple.   Lungs: Clear to auscultation bilaterally. No wheezing present.   Heart: Regular rate and rhythm. S1 and S2 present with no murmurs/gallop/rub. No pedal edema. No JVD present.   Abdomen: Soft, non-distended, non-tender. No rebound tenderness/guarding. Bowel sounds are normal.   Extremities: No  cyanosis, clubbing, or edema.  Skin: No Rash.   Neuro: Motor and sensory exams grossly intact. Good tone. Muscle strength 5/5 in all 4 extremities  Psych/mental status: Appropriate mood and affect. Responds appropriately to questions.           Stroke workup:  -CTh: negative   -CTA h/n: negative   -MRI brain:  pending ?  -ECHO:  Pef/ DD grade 1  -CUS:   -LDL:96    -A1c:  5.5  -TSH:  1.655  -home medications include: ASA 81 mg daily/lipitor added at 40 mgs     New onset htn - lisinopril 10 mgs po added            VITAL SIGNS: 24 HRS MIN & MAX LAST   Temp  Min: 97.1 °F (36.2 °C)  Max: 97.9 °F (36.6 °C) 97.5 °F (36.4 °C)   BP  Min: 129/79  Max: 168/104 (!) 163/90   Pulse  Min: 59  Max: 73  65   Resp  Min: 16  Max: 18 18   SpO2  Min: 97 %  Max: 99 % 98 %     I have reviewed the following labs:  Recent Labs   Lab 05/01/24  0500 05/01/24  0521   WBC 7.17  --    RBC 4.14*  --    HGB 10.2*  --    HCT 33.3* 36   MCV 80.4  --    MCH 24.6*  --    MCHC 30.6*  --    RDW 16.7  --    *  --    MPV 9.7  --      Recent Labs   Lab 05/01/24  0500      K 4.1   CO2 15*   BUN 6.1*   CREATININE 0.78   CALCIUM 9.8   ALBUMIN 4.1   ALKPHOS 111   ALT 15   AST 26   BILITOT 0.3     Microbiology Results (last 7 days)       ** No results found for the last 168 hours. **             See below for Radiology    Scheduled Med:  Current Facility-Administered Medications   Medication Dose Route Frequency    aspirin  81 mg Oral Daily    atorvastatin  40 mg Oral Daily    enoxparin  40 mg Subcutaneous Daily      Continuous Infusions:  Current Facility-Administered Medications   Medication Dose Route Frequency Last Rate Last Admin      PRN Meds:    Current Facility-Administered Medications:     acetaminophen, 650 mg, Oral, Q6H PRN    bisacodyL, 10 mg, Rectal, Daily PRN    hydrALAZINE, 10 mg, Intravenous, Q6H PRN    melatonin, 6 mg, Oral, Nightly PRN    ondansetron, 4 mg, Intravenous, Q4H PRN    prochlorperazine, 5 mg, Intravenous, Q6H PRN     sodium chloride 0.9%, 10 mL, Intravenous, PRN    traMADoL, 50 mg, Oral, Q6H PRN     Assessment/Plan:  CVA workup negative   - TIA?    New onset htn  - lisinopril 10 mgs po daily     Left-sided weakness  - resolved       Hld  - placed on statin on this admission              VTE prophylaxis: lovenox     Patient condition:  Stable/    Anticipated discharge and Disposition:   low intensity therapy      All diagnosis and differential diagnosis have been reviewed; assessment and plan has been documented; I have personally reviewed the labs and test results that are presently available; I have reviewed the patients medication list; I have reviewed the consulting providers response and recommendations. I have reviewed or attempted to review medical records based upon their availability    All of the patient's questions have been  addressed and answered. Patient's is agreeable to the above stated plan. I will continue to monitor closely and make adjustments to medical management as needed.  _____________________________________________________________________    Nutrition Status:    Radiology:  I have personally reviewed the following imaging and agree with the radiologist.     Echo    Left Ventricle: The left ventricle is normal in size. Mildly increased   wall thickness. There is normal systolic function with a visually   estimated ejection fraction of 60 - 65%. Grade I diastolic dysfunction.    Right Ventricle: Normal right ventricular cavity size. Systolic   function is normal. TAPSE is 2.29 cm.    IVC/SVC: Normal venous pressure at 3 mmHg.    There was 6 mL of intravenous agitated saline (bubble) used. Study is   negative for shunt.      Leonel Tilley MD  Department of Hospital Medicine   [facility]   05/02/2024

## 2024-05-03 NOTE — PT/OT/SLP PROGRESS
Physical Therapy Treatment    Patient Name:  Naheed Carter   MRN:  [id number]    Recommendations:     Discharge therapy intensity: Low Intensity Therapy   Discharge Equipment Recommendations: walker, rolling  Barriers to discharge: None    Assessment:     Naheed Carter is a 49 y.o. female.  She presents with the following impairments/functional limitations: weakness, impaired endurance, impaired functional mobility, gait instability, impaired balance, decreased lower extremity function, decreased safety awareness.    Rehab Prognosis: Good; patient would benefit from acute skilled PT services to address these deficits and reach maximum level of function.    Recent Surgery: * No surgery found *      Plan:     During this hospitalization, patient would benefit from acute PT services 6 x/week to address the identified rehab impairments via gait training, therapeutic activities, therapeutic exercises, neuromuscular re-education and progress toward the following goals:    Plan of Care Expires:  05/30/24    Subjective     Chief Complaint: weakness in LLE and pins and needle plantar aspect left foot.     Objective:     Communicated with nurse prior to session.  Patient found right sidelying with   upon PT entry to room.     General Precautions: Standard, fall  Orthopedic Precautions: N/A  Braces: N/A  Respiratory Status: Room air  Blood Pressure: supine 138/64 and standing 119/70  Skin Integrity: Visible skin intact      Functional Mobility:  Mod I to get EOB and SBA STS  Gait 3 ft without AD, 75 ft with SC, and 75 ft with RW. Improved gait with decreased deviation when using RW rather than with SC.   4 in step ups to increase proximal hip stability to impact gait quality.      Patient left up in chair with call button in reach    GOALS:   Multidisciplinary Problems       Physical Therapy Goals          Problem: Physical Therapy    Goal Priority Disciplines Outcome Goal Variances Interventions   Physical Therapy  Goal     PT, PT/OT Progressing     Description: Goals to be met by: 24     Patient will increase functional independence with mobility by performin. Gait  x 200 feet with Modified [place] using Rolling Walker.                          Time Tracking:       Billable Minutes: Gait Training 25    Treatment Type: Treatment  PT/PTA: PTA     Number of PTA visits since last PT visit: 2024

## 2024-05-03 NOTE — NURSING
Nurses Note -- 4 Eyes      5/3/2024   1:12 PM      Skin assessed during: Admit      [x] No Altered Skin Integrity Present    [x]Prevention Measures Documented      [] Yes- Altered Skin Integrity Present or Discovered   [] LDA Added if Not in Epic (Describe Wound)   [] New Altered Skin Integrity was Present on Admit and Documented in LDA   [] Wound Image Taken    Wound Care Consulted? No    Attending Nurse:  Caity Cheung RN/Staff Member:  Ez CRAIG

## 2024-05-03 NOTE — PT/OT/SLP PROGRESS
Occupational Therapy   Treatment    Name: Naheed Carter  MRN: [id number]  Admitting Diagnosis:  <principal problem not specified>       Recommendations:     Recommended therapy intensity at discharge: Low Intensity Therapy   Discharge Equipment Recommendations:  walker, rolling  Barriers to discharge:       Assessment:     Naheed Carter is a 49 y.o. female with a medical diagnosis of CVA.  She presents with the following. Performance deficits affecting function are weakness, impaired endurance, impaired self care skills, impaired functional mobility, gait instability, impaired balance, decreased upper extremity function.   Session limited today 2/2 increased nausea/dizziness.     Rehab Prognosis:  Good; patient would benefit from acute skilled OT services to address these deficits and reach maximum level of function.       Plan:     Patient to be seen 3 x/week to address the above listed problems via self-care/home management, therapeutic activities, therapeutic exercises  Plan of Care Expires: 05/31/24  Plan of Care Reviewed with: patient    Subjective     Pain/Comfort:       Objective:     Communicated with: nrsg prior to session.  Patient found up in chair with   upon OT entry to room.    General Precautions: Standard, fall    Orthopedic Precautions:   Braces:    Respiratory Status:   Vital Signs:      Occupational Performance:       Functional Mobility/Transfers:  Patient completed Toilet Transfer Step Transfer technique with minimum assistance with  rolling walker  Functional Mobility: no LOB to toilet       Therapeutic Exercise:  Attempted LUE exercises; pt only able to complete x10 shoulder flexion/ext and x10 elbow flexion/ext before pt c/o increased nausea. Cold towel applied and nursing notified.   Pt states she feel like it is because she has not eaten much today, provided with saltines and encouraged pt to eat.       [facility] 6 Click ADL:      Patient Education:  Patient provided with verbal  education education regarding OT role/goals/POC, fall prevention, and safety awareness.  Understanding was verbalized.      Patient left up in chair with all lines intact, call button in reach, and nursing notified.    GOALS:   Multidisciplinary Problems       Occupational Therapy Goals          Problem: Occupational Therapy    Goal Priority Disciplines Outcome Interventions   Occupational Therapy Goal     OT, PT/OT Progressing    Description: Goals to be met by: in 1 month      Patient will increase functional independence with ADLs by performing:    UE Dressing with Modified [place].  LE Dressing with Modified [place] and Assistive Devices as needed.  Grooming while standing with Modified [place].  Toileting from toilet with Modified [place] and Assistive Devices as needed for hygiene and clothing management.   Toilet transfer to toilet with Modified [place].  Increased functional strength to WNL for LUE.                         Time Tracking:     OT Date of Treatment:    OT Start Time: 1013  OT Stop Time: 1030  OT Total Time (min): 17 min    Billable Minutes:Self Care/Home Management 17min    OT/JAMES: OT     Number of JAMES visits since last OT visit: 1    5/3/2024

## 2024-05-04 PROCEDURE — 25000003 PHARM REV CODE 250: Performed by: HOSPITALIST

## 2024-05-04 PROCEDURE — 97110 THERAPEUTIC EXERCISES: CPT | Mod: CQ

## 2024-05-04 PROCEDURE — 25000003 PHARM REV CODE 250: Performed by: INTERNAL MEDICINE

## 2024-05-04 PROCEDURE — 63600175 PHARM REV CODE 636 W HCPCS: Performed by: NURSE PRACTITIONER

## 2024-05-04 PROCEDURE — 25000003 PHARM REV CODE 250: Performed by: NURSE PRACTITIONER

## 2024-05-04 PROCEDURE — 21400001 HC TELEMETRY ROOM

## 2024-05-04 PROCEDURE — 97116 GAIT TRAINING THERAPY: CPT | Mod: CQ

## 2024-05-04 PROCEDURE — 97530 THERAPEUTIC ACTIVITIES: CPT | Mod: CO

## 2024-05-04 RX ORDER — LISINOPRIL 10 MG/1
10 TABLET ORAL DAILY
Status: DISCONTINUED | OUTPATIENT
Start: 2024-05-05 | End: 2024-05-04

## 2024-05-04 RX ORDER — LISINOPRIL 10 MG/1
10 TABLET ORAL DAILY
Status: DISCONTINUED | OUTPATIENT
Start: 2024-05-04 | End: 2024-05-05 | Stop reason: HOSPADM

## 2024-05-04 RX ADMIN — ACETAMINOPHEN 325MG 650 MG: 325 TABLET ORAL at 07:05

## 2024-05-04 RX ADMIN — ASPIRIN 81 MG CHEWABLE TABLET 81 MG: 81 TABLET CHEWABLE at 09:05

## 2024-05-04 RX ADMIN — ATORVASTATIN CALCIUM 40 MG: 40 TABLET, FILM COATED ORAL at 09:05

## 2024-05-04 RX ADMIN — ENOXAPARIN SODIUM 40 MG: 40 INJECTION SUBCUTANEOUS at 04:05

## 2024-05-04 RX ADMIN — LISINOPRIL 10 MG: 10 TABLET ORAL at 01:05

## 2024-05-04 NOTE — PT/OT/SLP PROGRESS
Physical Therapy Treatment    Patient Name:  Naheed Carter   MRN:  [id number]    Recommendations:     Discharge therapy intensity: Low Intensity Therapy   Discharge Equipment Recommendations: walker, rolling  Barriers to discharge: None    Assessment:     Naheed Carter is a 49 y.o. female.  She presents with the following impairments/functional limitations: weakness, impaired endurance, impaired functional mobility, gait instability, impaired balance, decreased lower extremity function, decreased safety awareness.    Rehab Prognosis: Good; patient would benefit from acute skilled PT services to address these deficits and reach maximum level of function.    Recent Surgery: * No surgery found *      Plan:     During this hospitalization, patient would benefit from acute PT services 6 x/week to address the identified rehab impairments via gait training, therapeutic activities, therapeutic exercises, neuromuscular re-education and progress toward the following goals:    Plan of Care Expires:  05/30/24    Subjective     Chief Complaint: I want to go home.     Objective:     Communicated with nurse prior to session.  Patient found supine with   upon PT entry to room.     General Precautions: Standard, fall  Orthopedic Precautions: N/A  Braces: N/A  Respiratory Status: Room air  Blood Pressure: 106/78  Skin Integrity: Visible skin intact      Functional Mobility:  Independent with bed mobility  Gait 140 ft x 2 RW SBA. 25 ft without AD CGA. Gait deviation with left stance due to weak hip musculature, mostly glut sharron  4 in step ups and dynamic balance activities to improve righting reactions  to prevent LOB/falls.     Education Provided:  Role and goals of PT, transfer training, bed mobility, gait training, balance training, safety awareness, assistive device, strengthening exercises, and importance of participating in PT to return to PLOF.     Patient left sitting edge of bed with call button in  reach    GOALS:   Multidisciplinary Problems       Physical Therapy Goals          Problem: Physical Therapy    Goal Priority Disciplines Outcome Goal Variances Interventions   Physical Therapy Goal     PT, PT/OT Progressing     Description: Goals to be met by: 24     Patient will increase functional independence with mobility by performin. Gait  x 200 feet with Modified [place] using Rolling Walker.                          Time Tracking:     Billable Minutes: Gait Training 15 and Therapeutic Exercise 12    Treatment Type: Treatment  PT/PTA: PTA     Number of PTA visits since last PT visit: 2     2024

## 2024-05-04 NOTE — PLAN OF CARE
Problem: Adult Inpatient Plan of Care  Goal: Plan of Care Review  Outcome: Progressing  Goal: Patient-Specific Goal (Individualized)  Outcome: Progressing  Goal: Absence of Hospital-Acquired Illness or Injury  Outcome: Progressing  Goal: Optimal Comfort and Wellbeing  Outcome: Progressing  Goal: Readiness for Transition of Care  Outcome: Progressing     Problem: Infection  Goal: Absence of Infection Signs and Symptoms  Outcome: Progressing     Problem: Stroke, Ischemic (Includes Transient Ischemic Attack)  Goal: Optimal Coping  Outcome: Progressing  Goal: Effective Bowel Elimination  Outcome: Progressing  Goal: Optimal Cerebral Tissue Perfusion  Outcome: Progressing  Goal: Optimal Cognitive Function  Outcome: Progressing  Goal: Improved Communication Skills  Outcome: Progressing  Goal: Optimal Functional Ability  Outcome: Progressing  Goal: Optimal Nutrition Intake  Outcome: Progressing  Goal: Effective Oxygenation and Ventilation  Outcome: Progressing  Goal: Improved Sensorimotor Function  Outcome: Progressing  Goal: Safe and Effective Swallow  Outcome: Progressing  Goal: Effective Urinary Elimination  Outcome: Progressing     Problem: Skin Injury Risk Increased  Goal: Skin Health and Integrity  Outcome: Progressing

## 2024-05-05 VITALS
TEMPERATURE: 98 F | HEIGHT: 61 IN | HEART RATE: 75 BPM | DIASTOLIC BLOOD PRESSURE: 84 MMHG | SYSTOLIC BLOOD PRESSURE: 123 MMHG | RESPIRATION RATE: 18 BRPM | BODY MASS INDEX: 34.13 KG/M2 | OXYGEN SATURATION: 99 % | WEIGHT: 180.75 LBS

## 2024-05-05 PROBLEM — E78.5 HYPERLIPIDEMIA LDL GOAL <70: Status: ACTIVE | Noted: 2024-05-05

## 2024-05-05 PROBLEM — R29.818 ACUTE FOCAL NEUROLOGICAL DEFICIT: Status: ACTIVE | Noted: 2024-05-05

## 2024-05-05 PROBLEM — I10 HVD (HYPERTENSIVE VASCULAR DISEASE): Status: ACTIVE | Noted: 2024-05-05

## 2024-05-05 PROBLEM — I63.9 STROKE: Status: ACTIVE | Noted: 2024-05-05

## 2024-05-05 PROBLEM — R53.1 ACUTE LEFT-SIDED WEAKNESS: Status: RESOLVED | Noted: 2024-05-05 | Resolved: 2024-05-05

## 2024-05-05 PROBLEM — G45.9 TIA (TRANSIENT ISCHEMIC ATTACK): Status: ACTIVE | Noted: 2024-05-05

## 2024-05-05 PROBLEM — R53.1 ACUTE LEFT-SIDED WEAKNESS: Status: ACTIVE | Noted: 2024-05-05

## 2024-05-05 PROCEDURE — 25000003 PHARM REV CODE 250: Performed by: INTERNAL MEDICINE

## 2024-05-05 PROCEDURE — 25000003 PHARM REV CODE 250: Performed by: NURSE PRACTITIONER

## 2024-05-05 PROCEDURE — 25000003 PHARM REV CODE 250: Performed by: HOSPITALIST

## 2024-05-05 RX ORDER — NAPROXEN SODIUM 220 MG/1
81 TABLET, FILM COATED ORAL DAILY
Qty: 360 TABLET | Refills: 0 | Status: SHIPPED | OUTPATIENT
Start: 2024-05-05 | End: 2025-05-05

## 2024-05-05 RX ORDER — LISINOPRIL 10 MG/1
10 TABLET ORAL DAILY
Qty: 90 TABLET | Refills: 3 | Status: SHIPPED | OUTPATIENT
Start: 2024-05-06 | End: 2025-05-06

## 2024-05-05 RX ORDER — ATORVASTATIN CALCIUM 40 MG/1
40 TABLET, FILM COATED ORAL DAILY
Qty: 90 TABLET | Refills: 0 | Status: SHIPPED | OUTPATIENT
Start: 2024-05-06 | End: 2024-08-04

## 2024-05-05 RX ADMIN — ATORVASTATIN CALCIUM 40 MG: 40 TABLET, FILM COATED ORAL at 09:05

## 2024-05-05 RX ADMIN — LISINOPRIL 10 MG: 10 TABLET ORAL at 09:05

## 2024-05-05 RX ADMIN — ACETAMINOPHEN 325MG 650 MG: 325 TABLET ORAL at 11:05

## 2024-05-05 RX ADMIN — ASPIRIN 81 MG CHEWABLE TABLET 81 MG: 81 TABLET CHEWABLE at 09:05

## 2024-05-05 NOTE — PLAN OF CARE
Problem: Adult Inpatient Plan of Care  Goal: Plan of Care Review  Outcome: Progressing  Goal: Patient-Specific Goal (Individualized)  Outcome: Progressing  Goal: Absence of Hospital-Acquired Illness or Injury  Outcome: Progressing  Goal: Optimal Comfort and Wellbeing  Outcome: Progressing  Goal: Readiness for Transition of Care  Outcome: Progressing     Problem: Infection  Goal: Absence of Infection Signs and Symptoms  Outcome: Progressing     Problem: Stroke, Ischemic (Includes Transient Ischemic Attack)  Goal: Optimal Coping  Outcome: Progressing  Goal: Effective Bowel Elimination  Outcome: Progressing  Goal: Optimal Cerebral Tissue Perfusion  Outcome: Progressing  Goal: Optimal Cognitive Function  Outcome: Progressing

## 2024-05-05 NOTE — DISCHARGE INSTRUCTIONS
1- take medications as indicated  2- record blood pressure at home   3- follow up with [facility]   4-follow up with your primary care MD

## 2024-05-05 NOTE — PROGRESS NOTES
[facility]  Hospital Medicine Progress Note    Late entry- progress note for 5/4/24    Chief Complaint: Inpatient Follow-up for Cerebrovascular Accident (EMS states pt went to bed between 8964-9523, woke up this am with left sided weakness and facial droop.  en route.)    HPI: Naheed Carter is a 49 y.o. female who  has a past medical history of PAD (peripheral artery disease).. The patient presented to [facility] on 5/1/2024      49-year-old female who presents to the emergency room on 05/01/2024 after acute onset of left-sided weakness. This  was associated with mild aphasia and left-sided facial drooping.  Reports woke up around 4:00 a.m. to use the restroom and noted weakness.  Nearly fell.  Called EMS and was transported to the hospital.  On admission she was noted to be mildly hypertensive.  On room air.  Labs unremarkable.  CT of the head showed no acute intracranial abnormalities.  She has just gone for her MRI which is pending.  Stroke team has already been notified.  She was not a candidate for TNK administration.  She was risk factors of hypertension.  She was on an aspirin but he was not take it regularly.  No other anticoagulation at home.  She was not on a statin    Interval Hx:     5/2/24 dr tilley - bp elevated and pt was throwing up. Initiated on lisinopril 10 mgs po daily. Continue asa/ lipitor     5/3/24 dr louann lowry - complains of headaches, initiated on lisinopril 10 mgs this am, already titrated to 20 mgs  daily. Will monitor  for possible discharge home in am     LLE weakness w paresthesias     05/04/2024 Dr. Tilley-chart reviewed patient examined.  Lisinopril 20 mg drop her blood pressure, patient became symptomatic.  We decided to go back to lisinopril 10 mg with good response.  She refers feeling a whole lot better.   working on home health with [facility] as well as referral to a primary care physician.  Patient will be discharged on  aspirin 81 mg p.o. daily/lisinopril 10 mg p.o. daily/Lipitor 40 mg p.o. daily            Case was discussed with patient's nurse and  on the floor.  Objective/physical exam:  General appearance: Well-developed, well-nourished female in no apparent distress.  HENT: Atraumatic head. Moist mucous membranes of oral cavity.  Eyes: Normal extraocular movements.   Neck: Supple.   Lungs: Clear to auscultation bilaterally. No wheezing present.   Heart: Regular rate and rhythm. S1 and S2 present with no murmurs/gallop/rub. No pedal edema. No JVD present.   Abdomen: Soft, non-distended, non-tender. No rebound tenderness/guarding. Bowel sounds are normal.   Extremities: No cyanosis, clubbing, or edema.  Skin: No Rash.   Neuro: Motor and sensory exams grossly intact. Good tone. Muscle strength 5/5 in all 4 extremities  Psych/mental status: Appropriate mood and affect. Responds appropriately to questions.           Stroke workup:  -CTh: negative   -CTA h/n: negative   -MRI brain: negative(please look under chart review/imaging)  -ECHO:  Pef/ DD grade 1  -CUS: no significant stenosis   -LDL:96    -A1c:  5.5  -TSH:  1.655  -home medications include: ASA 81 mg daily/lipitor added at 40 mgs     New onset htn - lisinopril to 10 mgs po daily       Plan - nwill discharge in am             VITAL SIGNS: 24 HRS MIN & MAX LAST   Temp  Min: 97.9 °F (36.6 °C)  Max: 98.3 °F (36.8 °C) 98.3 °F (36.8 °C)   BP  Min: 108/77  Max: 131/76 123/84   Pulse  Min: 63  Max: 77  75   Resp  Min: 18  Max: 18 18   SpO2  Min: 96 %  Max: 100 % 99 %     I have reviewed the following labs:  Recent Labs   Lab 05/01/24  0500 05/01/24  0521   WBC 7.17  --    RBC 4.14*  --    HGB 10.2*  --    HCT 33.3* 36   MCV 80.4  --    MCH 24.6*  --    MCHC 30.6*  --    RDW 16.7  --    *  --    MPV 9.7  --      Recent Labs   Lab 05/01/24  0500      K 4.1   CO2 15*   BUN 6.1*   CREATININE 0.78   CALCIUM 9.8   ALBUMIN 4.1   ALKPHOS 111   ALT 15   AST 26    BILITOT 0.3     Microbiology Results (last 7 days)       ** No results found for the last 168 hours. **             See below for Radiology    Scheduled Med:  Current Facility-Administered Medications   Medication Dose Route Frequency    aspirin  81 mg Oral Daily    atorvastatin  40 mg Oral Daily    enoxparin  40 mg Subcutaneous Daily    lisinopriL  10 mg Oral Daily      Continuous Infusions:  Current Facility-Administered Medications   Medication Dose Route Frequency Last Rate Last Admin      PRN Meds:    Current Facility-Administered Medications:     acetaminophen, 650 mg, Oral, Q6H PRN    bisacodyL, 10 mg, Rectal, Daily PRN    hydrALAZINE, 10 mg, Intravenous, Q6H PRN    melatonin, 6 mg, Oral, Nightly PRN    ondansetron, 4 mg, Intravenous, Q4H PRN    prochlorperazine, 5 mg, Intravenous, Q6H PRN    sodium chloride 0.9%, 10 mL, Intravenous, PRN     Assessment/Plan:  CVA workup negative   - tia?  - needs to be complioant with baby asa    New onset htn  - lisinopril 10 mgs po daily     Left-sided weakness  - resolving     Hld  - placed on statin on this admission   -lipitor 40 mgs po daily              VTE prophylaxis: lovenox     Patient condition:  Stable    Anticipated discharge and Disposition:   low intensity therapy/ home with home health      All diagnosis and differential diagnosis have been reviewed; assessment and plan has been documented; I have personally reviewed the labs and test results that are presently available; I have reviewed the patients medication list; I have reviewed the consulting providers response and recommendations. I have reviewed or attempted to review medical records based upon their availability    All of the patient's questions have been  addressed and answered. Patient's is agreeable to the above stated plan. I will continue to monitor closely and make adjustments to medical management as needed.  _____________________________________________________________________    Nutrition  Status:    Radiology:  I have personally reviewed the following imaging and agree with the radiologist.     CV Ultrasound Bilateral Doppler Carotid  The right internal carotid artery demonstrated no hemodynamically   significant stenosis  The left internal carotid artery demonstrated no hemodynamically   significant stenosis.   Bilateral vertebral arteries were patent with antegrade flow.      Leonel Tilley MD  Department of Hospital Medicine   [facility]   05/05/2024

## 2024-05-05 NOTE — DISCHARGE SUMMARY
[facility] Hospital Medicine Discharge Summary    Admit Date: 5/1/2024  Discharge Date and Time: 5/5/20244:10 PM  Admitting Physician:  Team  Discharging Physician: Leonel Tilley MD.  Primary Care Physician: Awais Lomas MD  Consults: Neurology/PT/OT/ST/     Discharge Diagnoses:  CVA workup negative   - tia?  - needs to be compliant with baby asa     New onset htn  - lisinopril 10 mgs po daily      Left-sided weakness  - resolving      Hld  - placed on statin on this admission   -lipitor 40 mgs po daily     Hx of PAD    Hospital Course:   Naheed Carter is a 49 y.o. female who  has a past medical history of PAD (peripheral artery disease).. The patient presented to [facility] on 5/1/2024      49-year-old female who presents to the emergency room on 05/01/2024 after acute onset of left-sided weakness. This  was associated with mild aphasia and left-sided facial drooping.  Reports woke up around 4:00 a.m. to use the restroom and noted weakness.  Nearly fell.  Called EMS and was transported to the hospital.  On admission she was noted to be mildly hypertensive.  On room air.  Labs unremarkable.  CT of the head showed no acute intracranial abnormalities.  She has just gone for her MRI which is pending.  Stroke team has already been notified.  She was not a candidate for TNK administration.  She has risk factors of hypertension.  She was on an aspirin but she was not take it regularly.  No other anticoagulation at home.  She was not on a statin     Stroke work up in progress   bp elevated and pt was throwing up. Controlled with prn iv antihypertensives and Initiated on lisinopril 10 mgs po daily. Continue asa and lipitor since cholesterol not at goal according to guidelines.    PT/OT ans ST consulted as part of stroke work up. Pt with recs for low intensity therapy.   was consulted for home health with physical therapy and patient has been set up with  [facility].  Also patient refers need for primary care physician so she has been referred to family practice Clinic at [facility].  Also, patient has been referred to become program   Her LLE weakness w paresthesias it is much improved with physical therapy.  Stroke workup as below and her MRI of the brain was negative for any acute findings.  Patient had some episodes of elevated BP while on lisinopril 10 mg p.o. daily.  We attempted to increase to Lisinopril 20 mg drop but her blood pressure dropped,  patient became symptomatic.  We decided to go back to lisinopril 10 mg with good response.  She refers feeling a whole lot better.   working on home health with a [facility] as well as referral to a primary care physician.  Patient will be discharged on aspirin 81 mg p.o. daily/lisinopril 10 mg p.o. daily/Lipitor 40 mg p.o. daily.  Emphasized need to take her baby aspirin 81 mg on a daily basis.  Also explained that she needs to rechecked blood pressure at home and keep a log for whenever she has follow-up with her primary care physicians..  Overall she has been doing well, has progressed and improved with physical therapy.  Patient has no questions  Pt was seen and examined on the day of discharge  Vitals:  VITAL SIGNS: 24 HRS MIN & MAX LAST   Temp  Min: 97.9 °F (36.6 °C)  Max: 98.3 °F (36.8 °C) 98.3 °F (36.8 °C)   BP  Min: 108/77  Max: 131/76 123/84   Pulse  Min: 63  Max: 77  75   Resp  Min: 18  Max: 18 18   SpO2  Min: 96 %  Max: 100 % 99 %       Physical Exam:  General appearance: Well-developed, well-nourished female in no apparent distress.  HENT: Atraumatic head. Moist mucous membranes of oral cavity.  Eyes: Normal extraocular movements.   Neck: Supple.   Lungs: Clear to auscultation bilaterally. No wheezing present.   Heart: Regular rate and rhythm. S1 and S2 present with no murmurs/gallop/rub. No pedal edema. No JVD present.   Abdomen: Soft, non-distended, non-tender. No rebound  tenderness/guarding. Bowel sounds are normal.   Extremities: No cyanosis, clubbing, or edema.  Skin: No Rash.   Neuro: Motor and sensory exams grossly intact. Good tone. Muscle strength 5/5 in all 4 extremities  Psych/mental status: Appropriate mood and affect. Responds appropriately to questions.              Stroke workup:  -CTh: negative   -CTA h/n: negative   -MRI brain: negative(please look under chart review/imaging)  -ECHO:  Pef/ DD grade 1  -CUS: no significant stenosis   -LDL:96    -A1c:  5.5  -TSH:  1.655      Recent Labs   Lab 05/01/24  0500 05/01/24  0521   WBC 7.17  --    RBC 4.14*  --    HGB 10.2*  --    HCT 33.3* 36   MCV 80.4  --    MCH 24.6*  --    MCHC 30.6*  --    RDW 16.7  --    *  --    MPV 9.7  --        Recent Labs   Lab 05/01/24  0500      K 4.1   CO2 15*   BUN 6.1*   CREATININE 0.78   CALCIUM 9.8   ALBUMIN 4.1   ALKPHOS 111   ALT 15   AST 26   BILITOT 0.3        Microbiology Results (last 7 days)       ** No results found for the last 168 hours. **             CV Ultrasound Bilateral Doppler Carotid  The right internal carotid artery demonstrated no hemodynamically   significant stenosis  The left internal carotid artery demonstrated no hemodynamically   significant stenosis.   Bilateral vertebral arteries were patent with antegrade flow.         Medication List        ASK your doctor about these medications      aspirin 81 MG Chew               Explained in detail to the patient about the discharge plan, medications, and follow-up visits. Pt understands and agrees with the treatment plan  Discharge Disposition:   home with home health  Discharged Condition: stable  Diet- cardiac  Dietary Orders (From admission, onward)       Start     Ordered    05/01/24 1045  Diet Heart Healthy  Diet effective now         05/01/24 1045                   Medications Per DC med rec  Activities as tolerated   Follow-up Information       [facility] Follow up.    Contact  information:  [phone]    [address]   [address]  [place] [address]             [facility] Follow up.    Specialties: Home Health Services, Home Therapy Services, Home Living Aide Services  Why: This will be  your home health provider.  Contact information:  [address]  [place] [address]  [phone]             [facility] Follow up.    Why: This is the provider for your rolling walker.  Contact information:  [address]  [address]  [place] [address]  [phone]             [facility] Follow up.    Why: Referral was sent to Family Practice Clinic. They will contact you with an appointment.   [phone]  Contact information:  [address] [phone]                         For further questions contact hospitalist office    Discharge time 33 minutes    For worsening symptoms, chest pain, shortness of breath, increased abdominal pain, high grade fever, stroke or stroke like symptoms, immediately go to the nearest Emergency Room or call 911 as soon as possible.      Leonel Mishra M.D, on 5/5/2024. at 4:10 PM.

## 2024-05-09 LAB — MELATONIN SERPL-MCNC: NORMAL NG/ML

## 2024-07-09 ENCOUNTER — DOCUMENT SCAN (OUTPATIENT)
Dept: HOME HEALTH SERVICES | Facility: HOSPITAL | Age: 49
End: 2024-07-09
Payer: MEDICAID

## 2024-08-05 PROBLEM — G45.9 TIA (TRANSIENT ISCHEMIC ATTACK): Status: RESOLVED | Noted: 2024-05-05 | Resolved: 2024-08-05

## 2025-01-30 ENCOUNTER — HOSPITAL ENCOUNTER (EMERGENCY)
Facility: HOSPITAL | Age: 50
Discharge: HOME | End: 2025-01-30
Attending: EMERGENCY MEDICINE
Payer: COMMERCIAL

## 2025-01-30 VITALS
BODY MASS INDEX: 32.1 KG/M2 | RESPIRATION RATE: 16 BRPM | DIASTOLIC BLOOD PRESSURE: 88 MMHG | HEART RATE: 66 BPM | OXYGEN SATURATION: 100 % | TEMPERATURE: 99 F | SYSTOLIC BLOOD PRESSURE: 108 MMHG | HEIGHT: 61 IN | WEIGHT: 170 LBS

## 2025-01-30 DIAGNOSIS — T50.905A ADVERSE EFFECT OF DRUG, INITIAL ENCOUNTER: ICD-10-CM

## 2025-01-30 DIAGNOSIS — M54.9 UPPER BACK PAIN ON RIGHT SIDE: ICD-10-CM

## 2025-01-30 DIAGNOSIS — S46.811A STRAIN OF RIGHT TRAPEZIUS MUSCLE, INITIAL ENCOUNTER: Primary | ICD-10-CM

## 2025-01-30 PROCEDURE — 63600175 PHARM REV CODE 636 W HCPCS: Mod: TB,JZ | Performed by: EMERGENCY MEDICINE

## 2025-01-30 PROCEDURE — 96374 THER/PROPH/DIAG INJ IV PUSH: CPT

## 2025-01-30 PROCEDURE — 99284 EMERGENCY DEPT VISIT MOD MDM: CPT | Mod: 25

## 2025-01-30 RX ORDER — METHOCARBAMOL 500 MG/1
1000 TABLET, FILM COATED ORAL 3 TIMES DAILY
Qty: 30 TABLET | Refills: 0 | Status: SHIPPED | OUTPATIENT
Start: 2025-01-30 | End: 2025-02-04

## 2025-01-30 RX ORDER — LIDOCAINE 50 MG/G
1 PATCH TOPICAL DAILY
Qty: 15 PATCH | Refills: 0 | Status: SHIPPED | OUTPATIENT
Start: 2025-01-30 | End: 2025-01-30

## 2025-01-30 RX ORDER — KETOROLAC TROMETHAMINE 10 MG/1
10 TABLET, FILM COATED ORAL EVERY 6 HOURS PRN
Qty: 20 TABLET | Refills: 0 | Status: SHIPPED | OUTPATIENT
Start: 2025-01-30 | End: 2025-02-04

## 2025-01-30 RX ORDER — KETOROLAC TROMETHAMINE 30 MG/ML
30 INJECTION, SOLUTION INTRAMUSCULAR; INTRAVENOUS
Status: COMPLETED | OUTPATIENT
Start: 2025-01-30 | End: 2025-01-30

## 2025-01-30 RX ORDER — LIDOCAINE 50 MG/G
1 PATCH TOPICAL DAILY
Qty: 15 PATCH | Refills: 0 | Status: SHIPPED | OUTPATIENT
Start: 2025-01-30

## 2025-01-30 RX ORDER — KETOROLAC TROMETHAMINE 10 MG/1
10 TABLET, FILM COATED ORAL EVERY 6 HOURS PRN
Qty: 20 TABLET | Refills: 0 | Status: SHIPPED | OUTPATIENT
Start: 2025-01-30 | End: 2025-01-30

## 2025-01-30 RX ORDER — METHOCARBAMOL 500 MG/1
1000 TABLET, FILM COATED ORAL 3 TIMES DAILY
Qty: 30 TABLET | Refills: 0 | Status: SHIPPED | OUTPATIENT
Start: 2025-01-30 | End: 2025-01-30

## 2025-01-30 RX ADMIN — KETOROLAC TROMETHAMINE 30 MG: 30 INJECTION, SOLUTION INTRAMUSCULAR at 04:01

## 2025-01-30 NOTE — ED PROVIDER NOTES
Encounter Date: 1/30/2025    SCRIBE #1 NOTE: I, Wm Stephens, am scribing for, and in the presence of,  Kelly Onofre MD. I have scribed the following portions of the note - Other sections scribed: HPI,ROS,PE.       History     Chief Complaint   Patient presents with    Anxiety     Arrives [facility] 7 reports took Tizanidine 4mg from sister due to l shoulder pain at 0000 then woke up at 0200 w/ anxiety/dry mouth - symptoms subsided at this time     48 y/o female with PMHx of HTN and PAD presents to ED c/o  back pain onset ~2x weeks. Pt reports the pain is located to her right upper back, and states it radiates down her right arm since onset. She reports taking a tylenol extra strength with mild relief. She reports taking her sisters Tizanidine tonight ~00:00, states she woke up ~1x hour ago with SOB and anxiety, and called EMS. 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     The history is provided by the patient.     Review of patient's allergies indicates:   Allergen Reactions    Penicillins      Past Medical History:   Diagnosis Date    PAD (peripheral artery disease)      History reviewed. No pertinent surgical history.  No family history on file.     Review of Systems   Respiratory:  Positive for shortness of breath.    Musculoskeletal:  Positive for arthralgias (right arm pain), back pain (right upper back pain, radiating down right arm) and myalgias (right arm pain). Negative for neck pain.   Psychiatric/Behavioral:  The patient is nervous/anxious.        Physical Exam     Initial Vitals [01/30/25 0233]   BP Pulse Resp Temp SpO2   (!) 147/98 73 16 98.8 °F (37.1 °C) 100 %      MAP       --         Physical Exam    Nursing note and vitals reviewed.  Constitutional: She appears well-developed and well-nourished. She is not diaphoretic. She does not appear ill. No distress.   HENT:   Head: Normocephalic and atraumatic.   Right Ear: External ear  normal.   Left Ear: External ear normal. Mouth/Throat: Oropharynx is clear and moist.   Eyes: Conjunctivae are normal.   Neck: Neck supple. No tracheal deviation present.   Cardiovascular:  Normal rate, regular rhythm and normal heart sounds.           No murmur heard.  Pulses:       Radial pulses are 2+ on the right side.   Pulmonary/Chest: Breath sounds normal. No respiratory distress. She has no wheezes. She has no rhonchi. She has no rales.   Abdominal: Abdomen is soft. She exhibits no distension. There is no abdominal tenderness.   No right CVA tenderness.  No left CVA tenderness.   Musculoskeletal:         General: Tenderness (tenderness and tightness medial of the right scapula) present. Normal range of motion.        Arms:       Cervical back: Neck supple.      Comments: Full ROM to the right shoulder and right elbow.      Neurological: She is alert and oriented to person, place, and time. GCS score is 15. GCS eye subscore is 4. GCS verbal subscore is 5. GCS motor subscore is 6.   Skin: Skin is warm and dry. Capillary refill takes less than 2 seconds. No rash noted. No pallor.   Psychiatric: Her mood appears anxious (slightly).         ED Course   Procedures  Labs Reviewed - No data to display       Imaging Results    None          Medications   ketorolac injection 30 mg (30 mg Intravenous Given 1/30/25 0405)     Medical Decision Making  The differential diagnosis includes, but is not limited to, muscle strain, medication side effect, neuropathy.       Improved with Toradol  Pain appears to be msk on exam  Rx Toradol, robaxin and lidocaine patches   Handout of exercises given for stretching upper back     Problems Addressed:  Adverse effect of drug, initial encounter: acute illness or injury that poses a threat to life or bodily functions  Strain of right trapezius muscle, initial encounter: acute illness or injury that poses a threat to life or bodily functions  Upper back pain on right side: acute illness  or injury that poses a threat to life or bodily functions    Risk  Prescription drug management.            Scribe Attestation:   Scribe #1: I performed the above scribed service and the documentation accurately describes the services I performed. I attest to the accuracy of the note.    Attending Attestation:           Physician Attestation for Scribe:  Physician Attestation Statement for Scribe #1: I, Kelly Onofre MD, reviewed documentation, as scribed by Wm Stephens in my presence, and it is both accurate and complete.             ED Course as of 01/30/25 0441   Thu Jan 30, 2025   0433 Pain is improved some after Toradol.  Discussed plan with patient and will discharge home with Toradol and lidocaine patches.  Recommend she not take any more Zanaflex [KM]      ED Course User Index  [KM] Kelly Onofre MD                           Clinical Impression:  Final diagnoses:  [S46.811A] Strain of right trapezius muscle, initial encounter (Primary)  [M54.9] Upper back pain on right side  [T50.905A] Adverse effect of drug, initial encounter          ED Disposition Condition    Discharge Stable          ED Prescriptions       Medication Sig Dispense Start Date End Date Auth. Provider    ketorolac (TORADOL) 10 mg tablet Take 1 tablet (10 mg total) by mouth every 6 (six) hours as needed for Pain. 20 tablet 1/30/2025 2/4/2025 Kelly Onofre MD    methocarbamoL (ROBAXIN) 500 MG Tab Take 2 tablets (1,000 mg total) by mouth 3 (three) times daily. for 5 days 30 tablet 1/30/2025 2/4/2025 Kelly Onofre MD    LIDOcaine (LIDODERM) 5 % Place 1 patch onto the skin once daily. Remove & Discard patch within 12 hours or as directed by MD 15 patch 1/30/2025 -- Kelly Onofre MD          Follow-up Information       Follow up With Specialties Details Why Contact Info    [facility] - Emergency Dept Emergency Medicine  As needed, If symptoms worsen [facility] [facility] [id number] [phone]     Awais Lomas MD Family Medicine Schedule an appointment as soon as possible for a visit  If symptoms worsen [address] [facility] [address]  [phone]               Kelly Onofre MD  01/30/25 4157